# Patient Record
Sex: MALE | Race: BLACK OR AFRICAN AMERICAN | NOT HISPANIC OR LATINO | URBAN - METROPOLITAN AREA
[De-identification: names, ages, dates, MRNs, and addresses within clinical notes are randomized per-mention and may not be internally consistent; named-entity substitution may affect disease eponyms.]

---

## 2018-11-14 ENCOUNTER — INPATIENT (INPATIENT)
Facility: HOSPITAL | Age: 23
LOS: 13 days | Discharge: HOME | End: 2018-11-28
Attending: PSYCHIATRY & NEUROLOGY | Admitting: PSYCHIATRY & NEUROLOGY
Payer: MEDICAID

## 2018-11-14 VITALS
SYSTOLIC BLOOD PRESSURE: 146 MMHG | OXYGEN SATURATION: 100 % | TEMPERATURE: 98 F | HEART RATE: 76 BPM | DIASTOLIC BLOOD PRESSURE: 106 MMHG | RESPIRATION RATE: 16 BRPM

## 2018-11-14 DIAGNOSIS — F29 UNSPECIFIED PSYCHOSIS NOT DUE TO A SUBSTANCE OR KNOWN PHYSIOLOGICAL CONDITION: ICD-10-CM

## 2018-11-14 LAB
ALBUMIN SERPL ELPH-MCNC: 5.3 G/DL — HIGH (ref 3.3–5)
ALP SERPL-CCNC: 63 U/L — SIGNIFICANT CHANGE UP (ref 40–120)
ALT FLD-CCNC: 17 U/L — SIGNIFICANT CHANGE UP (ref 4–41)
APAP SERPL-MCNC: < 15 UG/ML — LOW (ref 15–25)
AST SERPL-CCNC: 17 U/L — SIGNIFICANT CHANGE UP (ref 4–40)
BASOPHILS # BLD AUTO: 0.05 K/UL — SIGNIFICANT CHANGE UP (ref 0–0.2)
BASOPHILS NFR BLD AUTO: 0.9 % — SIGNIFICANT CHANGE UP (ref 0–2)
BILIRUB SERPL-MCNC: 0.3 MG/DL — SIGNIFICANT CHANGE UP (ref 0.2–1.2)
BUN SERPL-MCNC: 8 MG/DL — SIGNIFICANT CHANGE UP (ref 7–23)
CALCIUM SERPL-MCNC: 10.2 MG/DL — SIGNIFICANT CHANGE UP (ref 8.4–10.5)
CHLORIDE SERPL-SCNC: 98 MMOL/L — SIGNIFICANT CHANGE UP (ref 98–107)
CO2 SERPL-SCNC: 29 MMOL/L — SIGNIFICANT CHANGE UP (ref 22–31)
CREAT SERPL-MCNC: 0.8 MG/DL — SIGNIFICANT CHANGE UP (ref 0.5–1.3)
EOSINOPHIL # BLD AUTO: 0.02 K/UL — SIGNIFICANT CHANGE UP (ref 0–0.5)
EOSINOPHIL NFR BLD AUTO: 0.4 % — SIGNIFICANT CHANGE UP (ref 0–6)
ETHANOL BLD-MCNC: < 10 MG/DL — SIGNIFICANT CHANGE UP
GLUCOSE SERPL-MCNC: 101 MG/DL — HIGH (ref 70–99)
HCT VFR BLD CALC: 48.3 % — SIGNIFICANT CHANGE UP (ref 39–50)
HGB BLD-MCNC: 15.5 G/DL — SIGNIFICANT CHANGE UP (ref 13–17)
IMM GRANULOCYTES # BLD AUTO: 0.01 # — SIGNIFICANT CHANGE UP
IMM GRANULOCYTES NFR BLD AUTO: 0.2 % — SIGNIFICANT CHANGE UP (ref 0–1.5)
LYMPHOCYTES # BLD AUTO: 2.1 K/UL — SIGNIFICANT CHANGE UP (ref 1–3.3)
LYMPHOCYTES # BLD AUTO: 37.8 % — SIGNIFICANT CHANGE UP (ref 13–44)
MCHC RBC-ENTMCNC: 26.9 PG — LOW (ref 27–34)
MCHC RBC-ENTMCNC: 32.1 % — SIGNIFICANT CHANGE UP (ref 32–36)
MCV RBC AUTO: 83.9 FL — SIGNIFICANT CHANGE UP (ref 80–100)
MONOCYTES # BLD AUTO: 0.39 K/UL — SIGNIFICANT CHANGE UP (ref 0–0.9)
MONOCYTES NFR BLD AUTO: 7 % — SIGNIFICANT CHANGE UP (ref 2–14)
NEUTROPHILS # BLD AUTO: 2.99 K/UL — SIGNIFICANT CHANGE UP (ref 1.8–7.4)
NEUTROPHILS NFR BLD AUTO: 53.7 % — SIGNIFICANT CHANGE UP (ref 43–77)
NRBC # FLD: 0 — SIGNIFICANT CHANGE UP
PLATELET # BLD AUTO: 179 K/UL — SIGNIFICANT CHANGE UP (ref 150–400)
PMV BLD: 11.3 FL — SIGNIFICANT CHANGE UP (ref 7–13)
POTASSIUM SERPL-MCNC: 3.3 MMOL/L — LOW (ref 3.5–5.3)
POTASSIUM SERPL-SCNC: 3.3 MMOL/L — LOW (ref 3.5–5.3)
PROT SERPL-MCNC: 8.5 G/DL — HIGH (ref 6–8.3)
RBC # BLD: 5.76 M/UL — SIGNIFICANT CHANGE UP (ref 4.2–5.8)
RBC # FLD: 13.2 % — SIGNIFICANT CHANGE UP (ref 10.3–14.5)
SALICYLATES SERPL-MCNC: < 5 MG/DL — LOW (ref 15–30)
SODIUM SERPL-SCNC: 141 MMOL/L — SIGNIFICANT CHANGE UP (ref 135–145)
TSH SERPL-MCNC: 4.57 UIU/ML — HIGH (ref 0.27–4.2)
WBC # BLD: 5.56 K/UL — SIGNIFICANT CHANGE UP (ref 3.8–10.5)
WBC # FLD AUTO: 5.56 K/UL — SIGNIFICANT CHANGE UP (ref 3.8–10.5)

## 2018-11-14 PROCEDURE — 99285 EMERGENCY DEPT VISIT HI MDM: CPT

## 2018-11-14 NOTE — ED PROVIDER NOTE - OBJECTIVE STATEMENT
23 yom with hx of schizophrenia diagnosed 1 year ago but on no meds. Aunt notes that pt has been more withdrawn than usual, pacing the house, making family members uncomfortable. No altercation, verbal or physical ever, no suicidal or homocidal gestures or actions. No problems eating as far as family knows. Does not sleep well. Patient agreed to come in because he agrees "I need med."

## 2018-11-14 NOTE — ED BEHAVIORAL HEALTH ASSESSMENT NOTE - DESCRIPTION
Calm but non-cooperative   Vital Signs Last 24 Hrs  T(C): 36.9 (14 Nov 2018 18:33), Max: 36.9 (14 Nov 2018 18:33)  T(F): 98.5 (14 Nov 2018 18:33), Max: 98.5 (14 Nov 2018 18:33)  HR: 76 (14 Nov 2018 18:33) (76 - 76)  BP: 146/106 (14 Nov 2018 18:33) (146/106 - 146/106)  BP(mean): --  RR: 16 (14 Nov 2018 18:33) (16 - 16)  SpO2: 100% (14 Nov 2018 18:33) (100% - 100%) None reported Lives with family

## 2018-11-14 NOTE — ED BEHAVIORAL HEALTH ASSESSMENT NOTE - HPI (INCLUDE ILLNESS QUALITY, SEVERITY, DURATION, TIMING, CONTEXT, MODIFYING FACTORS, ASSOCIATED SIGNS AND SYMPTOMS)
24 yo AAM w/ h/o schizophrenia and chronic hx of treatment/medication non-compliance. Per family patient has been pacing, awake all night, attempting to break into his sisters room. On examination in the ED pt is internally pre-occupied and limited in his response due to what appears to be severe internal preoccupation. Pt asked what brought him to the hospital? Pt states, " depression," and then start to laughing and state " I  can't sleep." Pt unable to continue interview due to his severe sx of psychosis. 22 yo AAM w/ h/o schizophrenia and chronic hx of treatment/medication non-compliance. Per family patient has been pacing, awake all night, attempting to break into his sisters room. On examination in the ED pt is internally pre-occupied and limited in his response due to what appears to be severe internal preoccupation. Pt asked what brought him to the hospital? Pt states, " depression," and then start to laughing and state " I can't sleep." Pt unable to continue interview due to his severe sx of psychosis.    See SINGH  note for collateral info. 24 yo AAM w/ h/o schizophrenia and chronic hx of treatment/medication non-compliance. Per family patient has been pacing, awake all night, attempting to break into his sisters room. On examination in the ED pt is internally pre-occupied and limited in his response due to what appears to be severe internal preoccupation. Pt asked what brought him to the hospital? Pt states, " depression," and then start to laughing and state " I can't sleep." Pt unable to continue interview due to his severe sx of psychosis.  Per SW collateral info: The patient resides with the informant in her home, along with his grandmother, another aunt, sister and niece (7 y/o), brother (27 y/o), 2 tenants and their daughters, ages 7 and 3. The patient has no OP care. He has never had IP care. The informant called 911 today because of worsening decompensation which has been causing family members to fear for their safety.     The patient has been isolative for a long time, but this has been worse for the past 2 weeks. He has never spoken unless spoken to for many years. For the past couple of months he will not join the family in the kitchen for meals, and refuses to eat most of the food family members bring to his room. Lately he has been hiding in the garage while the family goes to Confucianism services. Last night he took a coin to force the lock to his sister’s bedroom door open, pushing to get in, while she pushed back, frightening her. The patient suffers with insomnia, and sleeps a couple of hours in the morning. Until recently he had been sleeping in the bathtub, getting wet, seemingly unconcerned. He is totally socially isolative, not being willing to emerge from his room, and speaks minimally only when spoken to. He does not tend to hygiene at all. He has been smiling to himself inappropriately. He has been behaving bizarrely, such as sitting on the floor of his grandmother’s bedroom today, staring blankly, and another room another day. Today the patient’s mother and aunt became so afraid due to his behavior that they locked themselves in their bedrooms. The informant had made an appointment for the patient to see a psychologist, details unknown, but the patient refused to go. The patient has no history of substance abuse. He has never attempted to harm others or damage property, nor verbalized thoughts of such behavior. He has never expressed passive or active suicidal ideation, nor engaged in self-injurious behavior. He does not have access to a gun. There is no family history of mental illness. He has no medical ailments and is on no medications.     Last year the patient went to [facility] ED, from which he was discharged with medication, details unknown, which he refused to take. This past summer he was sent to stay with his father in [facility]. His father took him to a doctor who prescribed medication, details unknown. The patient discontinued the medication due to tremulousness.     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 He had been attending [facility] [employer] years ago before his illness. Patient’s father resides in [place], [place] and his mother resides in [place]. He was raised by his grandmother.

## 2018-11-14 NOTE — ED BEHAVIORAL HEALTH ASSESSMENT NOTE - OTHER
Family sitting down Speech difficult to understand due to low volume Appearing to be responding to internal stimuli TONG due to psychosis Pending transfer to open bed Either 9.39 or  9.27 pending

## 2018-11-14 NOTE — ED ADULT NURSE NOTE - CHIEF COMPLAINT QUOTE
PMH of schizophrenia, as per family patient has been pacing, awake all night, attempting to break into his sisters room, non compliant with medications. Pt currently calm.

## 2018-11-14 NOTE — ED ADULT NURSE NOTE - OBJECTIVE STATEMENT
23y y M AaOx3 brought in by EMS for increased bizarre behavior / depression. pt states that he has been feeling down and depressed starting from a month ago and it slowly has been getting worse. pt denies SI / HI / AH / VH at this time. pt has no visual lacerations/ trauma  noted. pt family members states that he has been acting more bizarre at home presenting with actions in the middle of the night which are out of character for him. labs collected and sent.

## 2018-11-14 NOTE — ED ADULT NURSE NOTE - NSIMPLEMENTINTERV_GEN_ALL_ED
Implemented All Universal Safety Interventions:  [place] to call system. Call bell, personal items and telephone within reach. Instruct patient to call for assistance. Room bathroom lighting operational. Non-slip footwear when patient is off stretcher. Physically safe environment: no spills, clutter or unnecessary equipment. Stretcher in lowest position, wheels locked, appropriate side rails in place.

## 2018-11-14 NOTE — ED BEHAVIORAL HEALTH NOTE - BEHAVIORAL HEALTH NOTE
Writer spoke with patient’s aunt, Diane Headley, [phone], in the [facility] ED, for collateral information. She reported the following:    The patient resides with the informant in her home, along with his grandmother, another aunt, sister and niece (9 y/o), brother (29 y/o), 2 tenants and their daughters, ages 7 and 3. The patient has no OP care. He has never had IP care. The informant called 911 today because of worsening decompensation which has been causing family members to fear for their safety.     The patient has been isolative for a long time, but this has been worse for the past 2 weeks. He has never spoken unless spoken to for many years. For the past couple of months he will not join the family in the kitchen for meals, and refuses to eat most of the food family members bring to his room. Lately he has been hiding in the garage while the family goes to Shinto services. Last night he took a coin to force the lock to his sister’s bedroom door open, pushing to get in, while she pushed back, frightening her. The patient suffers with insomnia, and sleeps a couple of hours in the morning. Until recently he had been sleeping in the bathtub, getting wet, seemingly unconcerned. He is totally socially isolative, not being willing to emerge from his room, and speaks minimally only when spoken to. He does not tend to hygiene at all. He has been smiling to himself inappropriately. He has been behaving bizarrely, such as sitting on the floor of his grandmother’s bedroom today, staring blankly, and another room another day. Today the patient’s mother and aunt became so afraid due to his behavior that they locked themselves in their bedrooms. The informant had made an appointment for the patient to see a psychologist, details unknown, but the patient refused to go. The patient has no history of substance abuse. He has never attempted to harm others or damage property, nor verbalized thoughts of such behavior. He has never expressed passive or active suicidal ideation, nor engaged in self-injurious behavior. He does not have access to a gun. There is no family history of mental illness. He has no medical ailments and is on no medications.     Last year the patient went to [facility] ED, from which he was discharged with medication, details unknown, which he refused to take. This past summer he was sent to stay with his father in [facility]. His father took him to a doctor who prescribed medication, details unknown. The patient discontinued the medication due to tremulousness.     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 He had been attending [employer] years ago before his illness. Patient’s father resides in [place], [place] and his mother resides in [place]. He was raised by his grandmother.     Writer called the [facility] NIDHI LOPEZ, Emilia Gerber, who stated no bed was available. Writer called [place], and spoke with Yoandy, who stated a bed was available there. Writer spoke with patient’s aunt, Diane Headley, [phone], in the [facility] ED, for collateral information. She reported the following:    The patient resides with the informant in her home, along with his grandmother, another aunt, sister and niece (7 y/o), brother (27 y/o), 2 tenants and their daughters, ages 7 and 3. The patient has no OP care. He has never had IP care. The informant called 911 today because of worsening decompensation which has been causing family members to fear for their safety.     The patient has been isolative for a long time, but this has been worse for the past 2 weeks. He has never spoken unless spoken to for many years. For the past couple of months he will not join the family in the kitchen for meals, and refuses to eat most of the food family members bring to his room. Lately he has been hiding in the garage while the family goes to Holiness services. Last night he took a coin to force the lock to his sister’s bedroom door open, pushing to get in, while she pushed back, frightening her. The patient suffers with insomnia, and sleeps a couple of hours in the morning. Until recently he had been sleeping in the bathtub, getting wet, seemingly unconcerned. He is totally socially isolative, not being willing to emerge from his room, and speaks minimally only when spoken to. He does not tend to hygiene at all. He has been smiling to himself inappropriately. He has been behaving bizarrely, such as sitting on the floor of his grandmother’s bedroom today, staring blankly, and another room another day. Today the patient’s mother and aunt became so afraid due to his behavior that they locked themselves in their bedrooms. The informant had made an appointment for the patient to see a psychologist, details unknown, but the patient refused to go. The patient has no history of substance abuse. He has never attempted to harm others or damage property, nor verbalized thoughts of such behavior. He has never expressed passive or active suicidal ideation, nor engaged in self-injurious behavior. He does not have access to a gun. There is no family history of mental illness. He has no medical ailments and is on no medications.     Last year the patient went to [facility] ED, from which he was discharged with medication, details unknown, which he refused to take. This past summer he was sent to stay with his father in [facility]. His father took him to a doctor who prescribed medication, details unknown. The patient discontinued the medication due to tremulousness.     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 He had been attending [employer] years ago before his illness. Patient’s father resides in [place], [place] and his mother resides in [place]. He was raised by his grandmother.     Writer called the [facility] NIDHI LOPEZ, Emilia Gerber, who stated no bed was available. Writer called [place], and spoke with Yoandy, who stated a bed was available there. Once the EKG was done, writer faxed it and the legal documents to Yoandy at [facility], to [phone]. Yoandy stated the doctor, Dr. Flor, was not available to inquire whether he had reviewed the evaluation. If he is accepted, the patient will go to admissions in [facility]. The UR staff member will be Nargis, phone [phone], fax [phone]. Writer will sign out the case to the morning , since if the patient is accepted, precert will be needed. Writer spoke with patient’s aunt, Diane Headley, [phone], in the [facility] ED, for collateral information. She reported the following:    The patient resides with the informant in her home, along with his grandmother, another aunt, sister and niece (7 y/o), brother (29 y/o), 2 tenants and their daughters, ages 7 and 3. The patient has no OP care. He has never had IP care. The informant called 911 today because of worsening decompensation which has been causing family members to fear for their safety.     The patient has been isolative for a long time, but this has been worse for the past 2 weeks. He has never spoken unless spoken to for many years. For the past couple of months he will not join the family in the kitchen for meals, and refuses to eat most of the food family members bring to his room. Lately he has been hiding in the garage while the family goes to Gnosticist services. Last night he took a coin to force the lock to his sister’s bedroom door open, pushing to get in, while she pushed back, frightening her. The patient suffers with insomnia, and sleeps a couple of hours in the morning. Until recently he had been sleeping in the bathtub, getting wet, seemingly unconcerned. He is totally socially isolative, not being willing to emerge from his room, and speaks minimally only when spoken to. He does not tend to hygiene at all. He has been smiling to himself inappropriately. He has been behaving bizarrely, such as sitting on the floor of his grandmother’s bedroom today, staring blankly, and another room another day. Today the patient’s mother and aunt became so afraid due to his behavior that they locked themselves in their bedrooms. The informant had made an appointment for the patient to see a psychologist, details unknown, but the patient refused to go. The patient has no history of substance abuse. He has never attempted to harm others or damage property, nor verbalized thoughts of such behavior. He has never expressed passive or active suicidal ideation, nor engaged in self-injurious behavior. He does not have access to a gun. There is no family history of mental illness. He has no medical ailments and is on no medications.     Last year the patient went to [facility] ED, from which he was discharged with medication, details unknown, which he refused to take. This past summer he was sent to stay with his father in [facility]. His father took him to a doctor who prescribed medication, details unknown. The patient discontinued the medication due to tremulousness.     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 He had been attending [employer] years ago before his illness. Patient’s father resides in [place], [place] and his mother resides in [place]. He was raised by his grandmother.     Writer called the [facility] NIDHI LOPEZ, Emilia Gerber, who stated no bed was available. Writer called [place], and spoke with Yoandy, who stated a bed was available there. Once the EKG was done, writer faxed it and the legal documents to Yoandy at [facility], to [phone]. Yoandy stated the doctor, Dr. Flor, was not available to inquire whether he had reviewed the evaluation. If he is accepted, the patient will go to admissions in [facility]. The UR staff member will be Nargis, phone [phone], fax [phone]. Writer will sign out the case to the morning , since if the patient is accepted, precert will be needed. Writer informed patient's aunt of the phone numbers of [facility] and the [facility] ED. Writer spoke with patient’s aunt, Diane Headley, [phone], in the [facility] ED, for collateral information. She reported the following:    The patient resides with the informant in her home, along with his grandmother, another aunt, sister and niece (9 y/o), brother (27 y/o), 2 tenants and their daughters, ages 7 and 3. The patient has no OP care. He has never had IP care. The informant called 911 today because of worsening decompensation which has been causing family members to fear for their safety.     The patient has been isolative for a long time, but this has been worse for the past 2 weeks. He has never spoken unless spoken to for many years. For the past couple of months he will not join the family in the kitchen for meals, and refuses to eat most of the food family members bring to his room. Lately he has been hiding in the garage while the family goes to Confucianism services. Last night he took a coin to force the lock to his sister’s bedroom door open, pushing to get in, while she pushed back, frightening her. The patient suffers with insomnia, and sleeps a couple of hours in the morning. Until recently he had been sleeping in the bathtub, getting wet, seemingly unconcerned. He is totally socially isolative, not being willing to emerge from his room, and speaks minimally only when spoken to. He does not tend to hygiene at all. He has been smiling to himself inappropriately. He has been behaving bizarrely, such as sitting on the floor of his grandmother’s bedroom today, staring blankly, and another room another day. Today the patient’s mother and aunt became so afraid due to his behavior that they locked themselves in their bedrooms. The informant had made an appointment for the patient to see a psychologist, details unknown, but the patient refused to go. The patient has no history of substance abuse. He has never attempted to harm others or damage property, nor verbalized thoughts of such behavior. He has never expressed passive or active suicidal ideation, nor engaged in self-injurious behavior. He does not have access to a gun. There is no family history of mental illness. He has no medical ailments and is on no medications.     Last year the patient went to [facility] ED, from which he was discharged with medication, details unknown, which he refused to take. This past summer he was sent to stay with his father in [facility]. His father took him to a doctor who prescribed medication, details unknown. The patient discontinued the medication due to tremulousness.     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 He had been attending [employer] years ago before his illness. Patient’s father resides in [place], [place] and his mother resides in [place]. He was raised by his grandmother.     Writer called the [facility] NIDHI LOPEZ, Emilia Gerber, who stated no bed was available. Writer called [place], and spoke with Yoandy, who stated a bed was available there. Once the EKG was done, writer faxed it and the legal documents to Yoandy at [facility], to [phone]. Yoandy stated the doctor, Dr. Flor, was not available to inquire whether he had reviewed the evaluation. If he is accepted, the patient will go to admissions in [facility]. The UR staff member will be Nargis, phone [phone], fax [phone]. Writer will sign out the case to the morning , since if the patient is accepted, precert will be needed. Writer informed patient's aunt of the phone numbers of [facility] and the [facility] ED. Writer informed the unit nurse and attending psychiatrists of the status of the case.

## 2018-11-14 NOTE — ED BEHAVIORAL HEALTH ASSESSMENT NOTE - OTHER PAST PSYCHIATRIC HISTORY (INCLUDE DETAILS REGARDING ONSET, COURSE OF ILLNESS, INPATIENT/OUTPATIENT TREATMENT)
Unclear hx but family states that they think he has never been admitted to a psych hospital. family reports no knowledge of meds regimen and he once went to an out pt doc but he had some reaction from which made him shake and so he stop going to the doc per family.

## 2018-11-14 NOTE — ED BEHAVIORAL HEALTH ASSESSMENT NOTE - SUMMARY
22 yo AAM w/ h/o schizophrenia and chronic hx of treatment/medication non-compliance. Per family patient has been pacing, awake all night, attempting to break into his sisters room. On examination in the ED pt is internally pre-occupied and limited in his response due to what appears to be severe internal preoccupation.   Base on pt level of psychosis in the form of thought disorder and disorganized behavior in the face of med non-compliance pt will need to be INVOL admitted to the psych unit for safety and stabilization.

## 2018-11-15 DIAGNOSIS — F29 UNSPECIFIED PSYCHOSIS NOT DUE TO A SUBSTANCE OR KNOWN PHYSIOLOGICAL CONDITION: ICD-10-CM

## 2018-11-15 LAB
AMPHET UR-MCNC: NEGATIVE — SIGNIFICANT CHANGE UP
APPEARANCE UR: CLEAR — SIGNIFICANT CHANGE UP
BACTERIA # UR AUTO: NEGATIVE — SIGNIFICANT CHANGE UP
BARBITURATES UR SCN-MCNC: NEGATIVE — SIGNIFICANT CHANGE UP
BENZODIAZ UR-MCNC: NEGATIVE — SIGNIFICANT CHANGE UP
BILIRUB UR-MCNC: NEGATIVE — SIGNIFICANT CHANGE UP
BLOOD UR QL VISUAL: NEGATIVE — SIGNIFICANT CHANGE UP
CANNABINOIDS UR-MCNC: NEGATIVE — SIGNIFICANT CHANGE UP
COCAINE METAB.OTHER UR-MCNC: NEGATIVE — SIGNIFICANT CHANGE UP
COLOR SPEC: YELLOW — SIGNIFICANT CHANGE UP
GLUCOSE UR-MCNC: NEGATIVE — SIGNIFICANT CHANGE UP
HYALINE CASTS # UR AUTO: SIGNIFICANT CHANGE UP
KETONES UR-MCNC: NEGATIVE — SIGNIFICANT CHANGE UP
LEUKOCYTE ESTERASE UR-ACNC: NEGATIVE — SIGNIFICANT CHANGE UP
METHADONE UR-MCNC: NEGATIVE — SIGNIFICANT CHANGE UP
NITRITE UR-MCNC: NEGATIVE — SIGNIFICANT CHANGE UP
OPIATES UR-MCNC: NEGATIVE — SIGNIFICANT CHANGE UP
OXYCODONE UR-MCNC: NEGATIVE — SIGNIFICANT CHANGE UP
PCP UR-MCNC: NEGATIVE — SIGNIFICANT CHANGE UP
PH UR: 6.5 — SIGNIFICANT CHANGE UP (ref 5–8)
PROT UR-MCNC: 20 — SIGNIFICANT CHANGE UP
RBC CASTS # UR COMP ASSIST: SIGNIFICANT CHANGE UP (ref 0–?)
SP GR SPEC: 1.02 — SIGNIFICANT CHANGE UP (ref 1–1.04)
SQUAMOUS # UR AUTO: SIGNIFICANT CHANGE UP
UROBILINOGEN FLD QL: NORMAL — SIGNIFICANT CHANGE UP
WBC UR QL: SIGNIFICANT CHANGE UP (ref 0–?)

## 2018-11-15 PROCEDURE — 99222 1ST HOSP IP/OBS MODERATE 55: CPT | Mod: 25

## 2018-11-15 PROCEDURE — 90853 GROUP PSYCHOTHERAPY: CPT

## 2018-11-15 RX ORDER — HALOPERIDOL DECANOATE 100 MG/ML
5 INJECTION INTRAMUSCULAR ONCE
Qty: 0 | Refills: 0 | Status: DISCONTINUED | OUTPATIENT
Start: 2018-11-15 | End: 2018-11-28

## 2018-11-15 RX ORDER — HALOPERIDOL DECANOATE 100 MG/ML
5 INJECTION INTRAMUSCULAR EVERY 4 HOURS
Qty: 0 | Refills: 0 | Status: DISCONTINUED | OUTPATIENT
Start: 2018-11-15 | End: 2018-11-15

## 2018-11-15 RX ORDER — RISPERIDONE 4 MG/1
1 TABLET ORAL AT BEDTIME
Qty: 0 | Refills: 0 | Status: DISCONTINUED | OUTPATIENT
Start: 2018-11-15 | End: 2018-11-19

## 2018-11-15 RX ADMIN — RISPERIDONE 1 MILLIGRAM(S): 4 TABLET ORAL at 21:01

## 2018-11-15 NOTE — ED ADULT NURSE REASSESSMENT NOTE - NS ED NURSE REASSESS COMMENT FT1
Author called [place], and spoke with Yoandy ([phone]).  Yoandy stated the doctor was not available and he would inquire whether the documentation had been reviewed for admitssion.  Time of call was 01:15.  Yoandy stated he would call back with more information.  awaiting call

## 2018-11-16 LAB
CHOLEST SERPL-MCNC: 144 MG/DL — SIGNIFICANT CHANGE UP (ref 120–199)
HBA1C BLD-MCNC: 5.2 % — SIGNIFICANT CHANGE UP (ref 4–5.6)
HDLC SERPL-MCNC: 58 MG/DL — HIGH (ref 35–55)
LIPID PNL WITH DIRECT LDL SERPL: 87 MG/DL — SIGNIFICANT CHANGE UP
T3 SERPL-MCNC: 115.1 NG/DL — SIGNIFICANT CHANGE UP (ref 80–200)
T4 AB SER-ACNC: 9.43 UG/DL — SIGNIFICANT CHANGE UP (ref 5.1–13)
TRIGL SERPL-MCNC: 38 MG/DL — SIGNIFICANT CHANGE UP (ref 10–149)
TSH SERPL-MCNC: 2.24 UIU/ML — SIGNIFICANT CHANGE UP (ref 0.27–4.2)

## 2018-11-16 PROCEDURE — 99232 SBSQ HOSP IP/OBS MODERATE 35: CPT

## 2018-11-16 RX ADMIN — RISPERIDONE 1 MILLIGRAM(S): 4 TABLET ORAL at 21:50

## 2018-11-17 PROCEDURE — 99232 SBSQ HOSP IP/OBS MODERATE 35: CPT

## 2018-11-17 RX ADMIN — RISPERIDONE 1 MILLIGRAM(S): 4 TABLET ORAL at 20:54

## 2018-11-18 RX ADMIN — RISPERIDONE 1 MILLIGRAM(S): 4 TABLET ORAL at 20:53

## 2018-11-19 PROCEDURE — 99232 SBSQ HOSP IP/OBS MODERATE 35: CPT

## 2018-11-19 RX ORDER — RISPERIDONE 4 MG/1
2 TABLET ORAL AT BEDTIME
Qty: 0 | Refills: 0 | Status: DISCONTINUED | OUTPATIENT
Start: 2018-11-19 | End: 2018-11-23

## 2018-11-19 RX ADMIN — RISPERIDONE 2 MILLIGRAM(S): 4 TABLET ORAL at 21:44

## 2018-11-20 PROCEDURE — 99232 SBSQ HOSP IP/OBS MODERATE 35: CPT

## 2018-11-20 RX ADMIN — RISPERIDONE 2 MILLIGRAM(S): 4 TABLET ORAL at 21:51

## 2018-11-21 PROCEDURE — 99232 SBSQ HOSP IP/OBS MODERATE 35: CPT

## 2018-11-21 RX ADMIN — RISPERIDONE 2 MILLIGRAM(S): 4 TABLET ORAL at 20:45

## 2018-11-22 RX ADMIN — RISPERIDONE 2 MILLIGRAM(S): 4 TABLET ORAL at 20:40

## 2018-11-23 PROCEDURE — 99232 SBSQ HOSP IP/OBS MODERATE 35: CPT | Mod: 25

## 2018-11-23 PROCEDURE — 90853 GROUP PSYCHOTHERAPY: CPT

## 2018-11-23 RX ORDER — RISPERIDONE 4 MG/1
3 TABLET ORAL AT BEDTIME
Qty: 0 | Refills: 0 | Status: DISCONTINUED | OUTPATIENT
Start: 2018-11-23 | End: 2018-11-28

## 2018-11-23 RX ADMIN — RISPERIDONE 3 MILLIGRAM(S): 4 TABLET ORAL at 20:47

## 2018-11-25 RX ADMIN — RISPERIDONE 3 MILLIGRAM(S): 4 TABLET ORAL at 00:14

## 2018-11-25 RX ADMIN — RISPERIDONE 3 MILLIGRAM(S): 4 TABLET ORAL at 21:06

## 2018-11-26 PROCEDURE — 99232 SBSQ HOSP IP/OBS MODERATE 35: CPT

## 2018-11-26 RX ADMIN — RISPERIDONE 3 MILLIGRAM(S): 4 TABLET ORAL at 21:26

## 2018-11-27 PROCEDURE — 90853 GROUP PSYCHOTHERAPY: CPT

## 2018-11-27 PROCEDURE — 99232 SBSQ HOSP IP/OBS MODERATE 35: CPT | Mod: 25

## 2018-11-27 RX ORDER — RISPERIDONE 4 MG/1
1 TABLET ORAL
Qty: 30 | Refills: 0
Start: 2018-11-27 | End: 2018-12-26

## 2018-11-27 RX ADMIN — RISPERIDONE 3 MILLIGRAM(S): 4 TABLET ORAL at 21:21

## 2018-11-28 VITALS — HEART RATE: 89 BPM | DIASTOLIC BLOOD PRESSURE: 70 MMHG | SYSTOLIC BLOOD PRESSURE: 109 MMHG | TEMPERATURE: 98 F

## 2018-11-28 PROBLEM — F20.89 OTHER SCHIZOPHRENIA: Chronic | Status: ACTIVE | Noted: 2018-11-14

## 2018-11-28 PROCEDURE — 99238 HOSP IP/OBS DSCHRG MGMT 30/<: CPT

## 2018-12-03 ENCOUNTER — OUTPATIENT (OUTPATIENT)
Dept: OUTPATIENT SERVICES | Facility: HOSPITAL | Age: 23
LOS: 1 days | Discharge: HOME | End: 2018-12-03
Payer: COMMERCIAL

## 2018-12-04 DIAGNOSIS — F20.1 DISORGANIZED SCHIZOPHRENIA: ICD-10-CM

## 2019-01-17 ENCOUNTER — OUTPATIENT (OUTPATIENT)
Dept: OUTPATIENT SERVICES | Facility: HOSPITAL | Age: 24
LOS: 1 days | Discharge: HOME | End: 2019-01-17
Payer: MEDICAID

## 2019-02-25 DIAGNOSIS — F20.9 SCHIZOPHRENIA, UNSPECIFIED: ICD-10-CM

## 2021-01-13 PROCEDURE — 99214 OFFICE O/P EST MOD 30 MIN: CPT | Mod: 95

## 2021-02-24 ENCOUNTER — OUTPATIENT (OUTPATIENT)
Dept: OUTPATIENT SERVICES | Facility: HOSPITAL | Age: 26
LOS: 1 days | End: 2021-02-24
Payer: SUBSIDIZED

## 2021-02-24 ENCOUNTER — APPOINTMENT (OUTPATIENT)
Dept: MRI IMAGING | Facility: HOSPITAL | Age: 26
End: 2021-02-24

## 2021-02-24 DIAGNOSIS — Z00.6 ENCOUNTER FOR EXAMINATION FOR NORMAL COMPARISON AND CONTROL IN CLINICAL RESEARCH PROGRAM: ICD-10-CM

## 2021-02-24 DIAGNOSIS — Z00.00 ENCOUNTER FOR GENERAL ADULT MEDICAL EXAMINATION WITHOUT ABNORMAL FINDINGS: ICD-10-CM

## 2021-02-24 PROCEDURE — 70551 MRI BRAIN STEM W/O DYE: CPT

## 2021-02-24 PROCEDURE — 70551 MRI BRAIN STEM W/O DYE: CPT | Mod: 26

## 2021-03-24 PROCEDURE — 99214 OFFICE O/P EST MOD 30 MIN: CPT | Mod: 95

## 2021-05-19 PROCEDURE — 99214 OFFICE O/P EST MOD 30 MIN: CPT | Mod: 95

## 2021-09-22 PROCEDURE — 99214 OFFICE O/P EST MOD 30 MIN: CPT | Mod: 95

## 2021-11-10 PROCEDURE — 99214 OFFICE O/P EST MOD 30 MIN: CPT | Mod: 95

## 2022-02-03 PROCEDURE — 99214 OFFICE O/P EST MOD 30 MIN: CPT | Mod: 95

## 2022-02-23 ENCOUNTER — APPOINTMENT (OUTPATIENT)
Dept: MRI IMAGING | Facility: HOSPITAL | Age: 27
End: 2022-02-23

## 2022-02-23 ENCOUNTER — OUTPATIENT (OUTPATIENT)
Dept: OUTPATIENT SERVICES | Facility: HOSPITAL | Age: 27
LOS: 1 days | End: 2022-02-23
Payer: SUBSIDIZED

## 2022-02-23 DIAGNOSIS — Z00.6 ENCOUNTER FOR EXAMINATION FOR NORMAL COMPARISON AND CONTROL IN CLINICAL RESEARCH PROGRAM: ICD-10-CM

## 2022-02-23 DIAGNOSIS — Z00.00 ENCOUNTER FOR GENERAL ADULT MEDICAL EXAMINATION WITHOUT ABNORMAL FINDINGS: ICD-10-CM

## 2022-02-23 PROCEDURE — 70551 MRI BRAIN STEM W/O DYE: CPT | Mod: 26

## 2022-02-23 PROCEDURE — 70551 MRI BRAIN STEM W/O DYE: CPT

## 2022-04-28 PROCEDURE — 99214 OFFICE O/P EST MOD 30 MIN: CPT | Mod: 95

## 2022-06-01 PROBLEM — Z00.00 ENCOUNTER FOR PREVENTIVE HEALTH EXAMINATION: Status: ACTIVE | Noted: 2022-06-01

## 2022-06-01 PROCEDURE — 99214 OFFICE O/P EST MOD 30 MIN: CPT | Mod: 95

## 2022-11-01 NOTE — ED BEHAVIORAL HEALTH ASSESSMENT NOTE - DIFFERENTIAL
Implemented All Universal Safety Interventions:  [place] to call system. Call bell, personal items and telephone within reach. Instruct patient to call for assistance. Room bathroom lighting operational. Non-slip footwear when patient is off stretcher. Physically safe environment: no spills, clutter or unnecessary equipment. Stretcher in lowest position, wheels locked, appropriate side rails in place. Implemented All Fall Risk Interventions:  [place] to call system. Call bell, personal items and telephone within reach. Instruct patient to call for assistance. Room bathroom lighting operational. Non-slip footwear when patient is off stretcher. Physically safe environment: no spills, clutter or unnecessary equipment. Stretcher in lowest position, wheels locked, appropriate side rails in place. Provide visual cue, wrist band, yellow gown, etc. Monitor gait and stability. Monitor for mental status changes and reorient to person, place, and time. Review medications for side effects contributing to fall risk. Reinforce activity limits and safety measures with patient and family. Schizophrenia vs Schizoaffective

## 2023-06-16 ENCOUNTER — EMERGENCY (EMERGENCY)
Facility: HOSPITAL | Age: 28
LOS: 1 days | Discharge: PSYCHIATRIC OR SUBSTANCE USE FACILITY | End: 2023-06-16
Attending: EMERGENCY MEDICINE | Admitting: EMERGENCY MEDICINE
Payer: MEDICAID

## 2023-06-16 VITALS
DIASTOLIC BLOOD PRESSURE: 72 MMHG | SYSTOLIC BLOOD PRESSURE: 128 MMHG | HEART RATE: 62 BPM | RESPIRATION RATE: 18 BRPM | TEMPERATURE: 98 F | OXYGEN SATURATION: 100 %

## 2023-06-16 VITALS
TEMPERATURE: 97 F | OXYGEN SATURATION: 100 % | RESPIRATION RATE: 18 BRPM | DIASTOLIC BLOOD PRESSURE: 85 MMHG | HEART RATE: 64 BPM | SYSTOLIC BLOOD PRESSURE: 121 MMHG

## 2023-06-16 DIAGNOSIS — F20.9 SCHIZOPHRENIA, UNSPECIFIED: ICD-10-CM

## 2023-06-16 LAB
ALBUMIN SERPL ELPH-MCNC: 4.7 G/DL — SIGNIFICANT CHANGE UP (ref 3.3–5)
ALP SERPL-CCNC: 47 U/L — SIGNIFICANT CHANGE UP (ref 40–120)
ALT FLD-CCNC: 14 U/L — SIGNIFICANT CHANGE UP (ref 4–41)
AMPHET UR-MCNC: NEGATIVE — SIGNIFICANT CHANGE UP
ANION GAP SERPL CALC-SCNC: 13 MMOL/L — SIGNIFICANT CHANGE UP (ref 7–14)
APAP SERPL-MCNC: <10 UG/ML — LOW (ref 15–25)
APPEARANCE UR: CLEAR — SIGNIFICANT CHANGE UP
AST SERPL-CCNC: 13 U/L — SIGNIFICANT CHANGE UP (ref 4–40)
B PERT DNA SPEC QL NAA+PROBE: SIGNIFICANT CHANGE UP
B PERT+PARAPERT DNA PNL SPEC NAA+PROBE: SIGNIFICANT CHANGE UP
BARBITURATES UR SCN-MCNC: NEGATIVE — SIGNIFICANT CHANGE UP
BASOPHILS # BLD AUTO: 0.05 K/UL — SIGNIFICANT CHANGE UP (ref 0–0.2)
BASOPHILS NFR BLD AUTO: 1.3 % — SIGNIFICANT CHANGE UP (ref 0–2)
BENZODIAZ UR-MCNC: NEGATIVE — SIGNIFICANT CHANGE UP
BILIRUB SERPL-MCNC: 0.3 MG/DL — SIGNIFICANT CHANGE UP (ref 0.2–1.2)
BILIRUB UR-MCNC: NEGATIVE — SIGNIFICANT CHANGE UP
BORDETELLA PARAPERTUSSIS (RAPRVP): SIGNIFICANT CHANGE UP
BUN SERPL-MCNC: 6 MG/DL — LOW (ref 7–23)
C PNEUM DNA SPEC QL NAA+PROBE: SIGNIFICANT CHANGE UP
CALCIUM SERPL-MCNC: 9.2 MG/DL — SIGNIFICANT CHANGE UP (ref 8.4–10.5)
CHLORIDE SERPL-SCNC: 102 MMOL/L — SIGNIFICANT CHANGE UP (ref 98–107)
CO2 SERPL-SCNC: 25 MMOL/L — SIGNIFICANT CHANGE UP (ref 22–31)
COCAINE METAB.OTHER UR-MCNC: NEGATIVE — SIGNIFICANT CHANGE UP
COLOR SPEC: COLORLESS — SIGNIFICANT CHANGE UP
CREAT SERPL-MCNC: 0.84 MG/DL — SIGNIFICANT CHANGE UP (ref 0.5–1.3)
CREATININE URINE RESULT, DAU: 27 MG/DL — SIGNIFICANT CHANGE UP
DIFF PNL FLD: NEGATIVE — SIGNIFICANT CHANGE UP
EGFR: 122 ML/MIN/1.73M2 — SIGNIFICANT CHANGE UP
EOSINOPHIL # BLD AUTO: 0.06 K/UL — SIGNIFICANT CHANGE UP (ref 0–0.5)
EOSINOPHIL NFR BLD AUTO: 1.5 % — SIGNIFICANT CHANGE UP (ref 0–6)
ETHANOL SERPL-MCNC: <10 MG/DL — SIGNIFICANT CHANGE UP
FLUAV SUBTYP SPEC NAA+PROBE: SIGNIFICANT CHANGE UP
FLUBV RNA SPEC QL NAA+PROBE: SIGNIFICANT CHANGE UP
GLUCOSE SERPL-MCNC: 113 MG/DL — HIGH (ref 70–99)
GLUCOSE UR QL: NEGATIVE — SIGNIFICANT CHANGE UP
HADV DNA SPEC QL NAA+PROBE: SIGNIFICANT CHANGE UP
HCOV 229E RNA SPEC QL NAA+PROBE: SIGNIFICANT CHANGE UP
HCOV HKU1 RNA SPEC QL NAA+PROBE: SIGNIFICANT CHANGE UP
HCOV NL63 RNA SPEC QL NAA+PROBE: SIGNIFICANT CHANGE UP
HCOV OC43 RNA SPEC QL NAA+PROBE: SIGNIFICANT CHANGE UP
HCT VFR BLD CALC: 45.6 % — SIGNIFICANT CHANGE UP (ref 39–50)
HGB BLD-MCNC: 14.2 G/DL — SIGNIFICANT CHANGE UP (ref 13–17)
HMPV RNA SPEC QL NAA+PROBE: SIGNIFICANT CHANGE UP
HPIV1 RNA SPEC QL NAA+PROBE: SIGNIFICANT CHANGE UP
HPIV2 RNA SPEC QL NAA+PROBE: SIGNIFICANT CHANGE UP
HPIV3 RNA SPEC QL NAA+PROBE: SIGNIFICANT CHANGE UP
HPIV4 RNA SPEC QL NAA+PROBE: SIGNIFICANT CHANGE UP
IANC: 1.66 K/UL — LOW (ref 1.8–7.4)
IMM GRANULOCYTES NFR BLD AUTO: 0.3 % — SIGNIFICANT CHANGE UP (ref 0–0.9)
KETONES UR-MCNC: NEGATIVE — SIGNIFICANT CHANGE UP
LEUKOCYTE ESTERASE UR-ACNC: NEGATIVE — SIGNIFICANT CHANGE UP
LYMPHOCYTES # BLD AUTO: 1.76 K/UL — SIGNIFICANT CHANGE UP (ref 1–3.3)
LYMPHOCYTES # BLD AUTO: 44.3 % — HIGH (ref 13–44)
M PNEUMO DNA SPEC QL NAA+PROBE: SIGNIFICANT CHANGE UP
MCHC RBC-ENTMCNC: 27 PG — SIGNIFICANT CHANGE UP (ref 27–34)
MCHC RBC-ENTMCNC: 31.1 GM/DL — LOW (ref 32–36)
MCV RBC AUTO: 86.9 FL — SIGNIFICANT CHANGE UP (ref 80–100)
METHADONE UR-MCNC: NEGATIVE — SIGNIFICANT CHANGE UP
MONOCYTES # BLD AUTO: 0.43 K/UL — SIGNIFICANT CHANGE UP (ref 0–0.9)
MONOCYTES NFR BLD AUTO: 10.8 % — SIGNIFICANT CHANGE UP (ref 2–14)
NEUTROPHILS # BLD AUTO: 1.66 K/UL — LOW (ref 1.8–7.4)
NEUTROPHILS NFR BLD AUTO: 41.8 % — LOW (ref 43–77)
NITRITE UR-MCNC: NEGATIVE — SIGNIFICANT CHANGE UP
NRBC # BLD: 0 /100 WBCS — SIGNIFICANT CHANGE UP (ref 0–0)
NRBC # FLD: 0 K/UL — SIGNIFICANT CHANGE UP (ref 0–0)
OPIATES UR-MCNC: NEGATIVE — SIGNIFICANT CHANGE UP
OXYCODONE UR-MCNC: NEGATIVE — SIGNIFICANT CHANGE UP
PCP SPEC-MCNC: SIGNIFICANT CHANGE UP
PCP UR-MCNC: NEGATIVE — SIGNIFICANT CHANGE UP
PH UR: 7 — SIGNIFICANT CHANGE UP (ref 5–8)
PLATELET # BLD AUTO: 198 K/UL — SIGNIFICANT CHANGE UP (ref 150–400)
POTASSIUM SERPL-MCNC: 3.4 MMOL/L — LOW (ref 3.5–5.3)
POTASSIUM SERPL-SCNC: 3.4 MMOL/L — LOW (ref 3.5–5.3)
PROT SERPL-MCNC: 7.3 G/DL — SIGNIFICANT CHANGE UP (ref 6–8.3)
PROT UR-MCNC: NEGATIVE — SIGNIFICANT CHANGE UP
RAPID RVP RESULT: SIGNIFICANT CHANGE UP
RBC # BLD: 5.25 M/UL — SIGNIFICANT CHANGE UP (ref 4.2–5.8)
RBC # FLD: 13.6 % — SIGNIFICANT CHANGE UP (ref 10.3–14.5)
RSV RNA SPEC QL NAA+PROBE: SIGNIFICANT CHANGE UP
RV+EV RNA SPEC QL NAA+PROBE: SIGNIFICANT CHANGE UP
SALICYLATES SERPL-MCNC: <0.3 MG/DL — LOW (ref 15–30)
SARS-COV-2 RNA SPEC QL NAA+PROBE: SIGNIFICANT CHANGE UP
SODIUM SERPL-SCNC: 140 MMOL/L — SIGNIFICANT CHANGE UP (ref 135–145)
SP GR SPEC: 1 — LOW (ref 1.01–1.05)
THC UR QL: NEGATIVE — SIGNIFICANT CHANGE UP
TOXICOLOGY SCREEN, DRUGS OF ABUSE, SERUM RESULT: SIGNIFICANT CHANGE UP
TSH SERPL-MCNC: 2.31 UIU/ML — SIGNIFICANT CHANGE UP (ref 0.27–4.2)
UROBILINOGEN FLD QL: SIGNIFICANT CHANGE UP
WBC # BLD: 3.97 K/UL — SIGNIFICANT CHANGE UP (ref 3.8–10.5)
WBC # FLD AUTO: 3.97 K/UL — SIGNIFICANT CHANGE UP (ref 3.8–10.5)

## 2023-06-16 PROCEDURE — 99285 EMERGENCY DEPT VISIT HI MDM: CPT

## 2023-06-16 PROCEDURE — 99285 EMERGENCY DEPT VISIT HI MDM: CPT | Mod: GC

## 2023-06-16 NOTE — ED ADULT NURSE REASSESSMENT NOTE - NS ED NURSE REASSESS COMMENT FT1
Pt ambulated to bathroom and back to  4 for sleep . Pt ambulates with steady gait, NAD, resp equal and unlabored.

## 2023-06-16 NOTE — ED BEHAVIORAL HEALTH ASSESSMENT NOTE - NSBHATTESTCOMMENTATTENDFT_PSY_A_CORE
28 yo male, residing with family in [place], unemployed, with psychiatric history schizophrenia, in current OP treatment with Dr. Chan at [facility], 2 prior inpatient psychiatric hospitalizations with last being 6 weeks ago in [place] for psychosis, no prior SA's, no prior NSSIB; no history of substance; no history of violence or legal issues BIB EMS due to being unable to care for self at home.    Patient evaluation is limited due to patient engagement. He is seen lying in bed covered in blanket and facing away from writer. He answers in one-word answers at low volume, with increased latency. He expresses depressed mood with constricted affect. Per collateral from patient's aunt, he has been isolative, internally preoccupied, and disorganized while not adhering to psychiatric medications. Per collateral, patient is unable to care for himself with basic needs such as eating, sleeping, or grooming. Of note, patient was also recently psychiatrically admitted 6 weeks ago due to psychosis symptoms. Though patient denies current psychosis symptoms, patient appears thought blocked and internally preoccupied, consistent with family's collateral. At this time, patient is at risk of harm to self due to inability to care for himself, and therefore meets criteria for involuntary psychiatric hospitalization.

## 2023-06-16 NOTE — ED BEHAVIORAL HEALTH ASSESSMENT NOTE - SUMMARY
26 yo male, residing with family in [place], unemployed, with psychiatric history schizophrenia, in current OP treatment with Dr. Chan at [facility], 2 prior inpatient psychiatric hospitalizations with last being 6 weeks ago in [place] for psychosis, no prior SA's, no prior NSSIB; no history of substance; no history of violence or legal issues BIB EMS due to being unable to care for self at home.    Patient evaluation is limited due to patient engagement. He is seen lying in bed covered in blanket and facing away from writer. He answers in one-word answers at low volume, with increased latency. He expresses depressed mood with constricted affect. Per collateral from patient's aunt, he has been isolative, internally preoccupied, and disorganized while not adhering to psychiatric medications. Per collateral, patient is unable to care for himself with basic needs such as eating, sleeping, or grooming. Of note, patient was also recently psychiatrically admitted 6 weeks ago due to psychosis symptoms. At this time, patient is at risk of harm to self due to inability to care for himself, and therefore meets criteria for involuntary psychiatric hospitalization. Admit on 2PC status.    Plan:  1. Admit on 2PC status  2. Psychiatric: Start risperidone 1mg QHS with goal of COCHRAN; Patient previously on Invega 117mg q4wks: PRN's: Zyprexa 5mg PO/IM PRN agitation/severe agitation  3. Medical: No known medical problems  4. Substance: No known substance use history  5. Dispo pending clinical improvement; Dr. Chan emailed regarding admission 28 yo male, residing with family in [place], unemployed, with psychiatric history schizophrenia, in current OP treatment with Dr. Chan at [facility], 2 prior inpatient psychiatric hospitalizations with last being 6 weeks ago in [place] for psychosis, no prior SA's, no prior NSSIB; no history of substance; no history of violence or legal issues BIB EMS due to being unable to care for self at home.    Patient evaluation is limited due to patient engagement. He is seen lying in bed covered in blanket and facing away from writer. He answers in one-word answers at low volume, with increased latency. He expresses depressed mood with constricted affect. Per collateral from patient's aunt, he has been isolative, internally preoccupied, and disorganized while not adhering to psychiatric medications. Per collateral, patient is unable to care for himself with basic needs such as eating, sleeping, or grooming. Of note, patient was also recently psychiatrically admitted 6 weeks ago due to psychosis symptoms. Though patient denies current psychosis symptoms, patient appears thought blocked and internally preoccupied, consistent with family's collateral. At this time, patient is at risk of harm to self due to inability to care for himself, and therefore meets criteria for involuntary psychiatric hospitalization. Admit on 2PC status.    Plan:  1. Admit on 2PC status  2. Psychiatric: Start risperidone 1mg QHS with goal of COCHRAN; Patient previously on Invega 117mg q4wks: PRN's: Zyprexa 5mg PO/IM PRN agitation/severe agitation  3. Medical: No known medical problems  4. Substance: No known substance use history  5. Dispo pending clinical improvement; Dr. Chan emailed regarding admission

## 2023-06-16 NOTE — ED BEHAVIORAL HEALTH ASSESSMENT NOTE - HPI (INCLUDE ILLNESS QUALITY, SEVERITY, DURATION, TIMING, CONTEXT, MODIFYING FACTORS, ASSOCIATED SIGNS AND SYMPTOMS)
28 yo male, residing with family in [place], unemployed, with psychiatric history schizophrenia, in current OP treatment with Dr. Chan at [facility], 2 prior inpatient psychiatric hospitalizations with last being 6 weeks ago in [place] for psychosis, no prior SA's, no prior NSSIB; no history of substance; no history of violence or legal issues BIB EMS due to being unable to care for self at home.    Patient seen and examined at bedside. Interview limited due to patient cooperation. Patient states "I'm depressed" but will explain further. He denies SI/HI. He denies AVH. He otherwise does not answer further questions regarding symptoms prior to presentation to the ED.    Per collateral from patient's aunt, Diane Headley ([phone]): The patient was in [place] for the past year with his mother. During this time, he was not regularly taking medications, with last COCHRAN administration being a year ago. About 6 weeks ago while in [place], the patient stopped eating, which he stated was because [employer] told him to. Due to poor PO intake, he collapsed, and was subsequently psychiatrically admitted for 1 month. Once discharged, the patient stopped taking medications. He then moved to [place] to live with his aunt. While at home in [place], he has been noted to decompensate. He is eating minimally, staying in bed most of the day, and has been seen talking and laughing to himself. She is unsure about the themes of the voices, as he becomes defensive if asked about them. His aunt has also found notes all over the patient's room, which are incoherent and unclear in content. He isolates himself throughout the day, and spends hours locked in the bathroom with the water running. He is awake through the night, either pacing or staying in the bathroom. Family has tried to encourage him to talk, but he becomes angry and isolates further. He denies feeling any depressed mood to family.    On Monday and Tuesday, family encouraged him to work with a cohen. He has not showered or changed his clothes since then, though his clothes are covered in cement. On Tuesday, someone told him to do something at work. He apparently screamed, punched the cement, and said he could not work any more. This morning, the patient was found by family lying on the floor, refusing to get up, which prompted family to call EMS .     He has not made any SI/HI statements. He has not been physically aggressive toward family. Family is advocating for psychiatric hospitalization.

## 2023-06-16 NOTE — ED ADULT NURSE NOTE - OBJECTIVE STATEMENT
Patient is a 28 yo male, h/x depression, schizophrenia, presenting via EMS after aunt called 9-1-1 for worsening depression. Per EMS, patient just returned from [place] where he has been off of his Invega injections for months. AAOx4, withdrawn, not endorsing any complaints, no signs of distress, denies SI/HI, hallucinations. Labs sent per orders. Pending EKG/urine sample.

## 2023-06-16 NOTE — ED BEHAVIORAL HEALTH NOTE - BEHAVIORAL HEALTH NOTE
Patient is accepted to [facility]. Dr. Nair is accepting. Nurse to nurse to be provided at [phone]. RN will arrange transport.  Writer called patient’s aunt Diane Headley ([phone]) to inform of transfer and left a message for call back. Patient is accepted to [facility]. Dr. Nair is accepting. Nurse to nurse to be provided at [phone]. RN will arrange transport.  Writer called patient’s aunt Diane Headley ([phone]) to inform of transfer and left a message for call back.  Worker called [employer] ( [phone]) and spoke to Zaina GOMES who provided auth # [id number]. worker faxed clinicals to [phone] and placed [facility] UR for follow up. Patient is accepted to [facility]. Dr. Nair is accepting. Nurse to nurse to be provided at [phone]. RN will arrange transport.  Writer called patient’s aunt Diane Headley ([phone]) to inform of transfer and left a message for call back.  Worker called [employer] ( [phone]) and spoke to Zaina GOMES who provided auth # [id number]. worker faxed clinicals to [phone] and placed [facility] UR for follow up. Legals placed in chart

## 2023-06-16 NOTE — ED PROVIDER NOTE - PROGRESS NOTE DETAILS
Consult psychiatry MALLORY Bolaños- Seen by psych, rec admission. Accepted to [facility]- Dr. Nair, Medically cleared for psychiatric admission

## 2023-06-16 NOTE — ED BEHAVIORAL HEALTH ASSESSMENT NOTE - DESCRIPTION
Lives with grandmother, grand aunt, aunt, and sister in [place]. Was living in [place] with mother until a few weeks ago. None reported Patient has been lying in his room with blanket covering him. No PRN's required. Patient has been lying in his room with blanket covering him. No PRN's required.    Vital Signs Last 24 Hrs  T(C): 36.3 (16 Jun 2023 14:02), Max: 36.7 (16 Jun 2023 10:41)  T(F): 97.4 (16 Jun 2023 14:02), Max: 98 (16 Jun 2023 10:41)  HR: 64 (16 Jun 2023 14:02) (62 - 64)  BP: 121/85 (16 Jun 2023 14:02) (121/85 - 128/72)  BP(mean): --  RR: 18 (16 Jun 2023 14:02) (18 - 18)  SpO2: 100% (16 Jun 2023 14:02) (100% - 100%)    Parameters below as of 16 Jun 2023 14:02  Patient On (Oxygen Delivery Method): room air

## 2023-06-16 NOTE — ED ADULT NURSE REASSESSMENT NOTE - NS ED NURSE REASSESS COMMENT FT1
Pt calm made aware of admission to [facility] report giving ems activated enroute eval on going.

## 2023-06-16 NOTE — ED BEHAVIORAL HEALTH ASSESSMENT NOTE - UNABLE TO CARE FOR SELF DETAILS
Patient not sleeping, eating, or caring for hygiene appropriately in past week due to psychosis symptoms

## 2023-06-16 NOTE — ED BEHAVIORAL HEALTH ASSESSMENT NOTE - CURRENT MEDICATION
No current medications.    Per chart review, patient was on Invega 6mg QHS and Invega Sustenna 117mg q4wks in 2022.

## 2023-06-16 NOTE — ED PROVIDER NOTE - NS ED ATTENDING STATEMENT MOD
Attending Only This was a shared visit with the LUANN. I reviewed and verified the documentation and independently performed the documented:

## 2023-06-16 NOTE — ED BEHAVIORAL HEALTH ASSESSMENT NOTE - NSBHMSESPABN_PSY_A_CORE
Providing one-word answers at barely audible volume./Soft volume/Slowed rate/Decreased productivity/Increased latency

## 2023-06-16 NOTE — ED PROVIDER NOTE - OBJECTIVE STATEMENT
Dr Tate  27-year-old male history of schizophrenia brought in by EMS from home chief complaint of more depressed than usual.  Spoke with EMS and and on the phone, states since Monday (4 days ago)) patient has not showered, been sleeping on the floor, refusing to eat, and talking irrational friends.  She states he gets very agitated and frustrated at home but has not been aggressive.  No concern for SI or HI.  States he is due risperidone IM next week (4 days from now) but she is concerned that he is decompensating.  Patient states he did not eat since yesterday.  But denies any chest pain denies any abdominal pain denies any nausea, vomiting or diarrhea denies any urinary complaints denies any SI or HI.  Denies any trauma.  No history of substance abuse.

## 2023-06-16 NOTE — ED BEHAVIORAL HEALTH ASSESSMENT NOTE - DETAILS
Pending bed availability Discussed with patient's aunt Diane Headley Patient not forthcoming regarding symptoms. Per transfer protocol

## 2023-06-16 NOTE — ED BEHAVIORAL HEALTH ASSESSMENT NOTE - GENERAL APPEARANCE
Patient covered in blanket facing away from writer with only head showing outside of blanket./Unable to assess

## 2023-06-16 NOTE — ED BEHAVIORAL HEALTH ASSESSMENT NOTE - ADDITIONAL DETAILS ALL
Per collateral from family, patient has not been any SI statements or taken any actions toward suicide.

## 2023-06-16 NOTE — ED BEHAVIORAL HEALTH ASSESSMENT NOTE - PSYCHIATRIC ISSUES AND PLAN (INCLUDE STANDING AND PRN MEDICATION)
Start risperidone 1mg QHS with goal of COCHRAN; Patient previously on Invega 117mg q4wks: PRN's: Zyprexa 5mg PO/IM PRN agitation/severe agitation Start risperidone 1mg QHS with goal of COCHRAN; Patient previously on Invega 117mg q4wks; PRN's: Zyprexa 5mg PO/IM PRN agitation/severe agitation

## 2023-06-16 NOTE — ED BEHAVIORAL HEALTH ASSESSMENT NOTE - OTHER PAST PSYCHIATRIC HISTORY (INCLUDE DETAILS REGARDING ONSET, COURSE OF ILLNESS, INPATIENT/OUTPATIENT TREATMENT)
In OP treatment with Dr. Chan at [facility], but only re-initiated recently after being [place] for a year.  2 prior IP hospitalizations, with last being 6 weeks ago in [place]  No prior SA's, NSSIB

## 2023-06-16 NOTE — ED ADULT TRIAGE NOTE - CHIEF COMPLAINT QUOTE
as per EMS family called 911 because pt is feeling depressed more than usual, not getting out of bed, pt avoids eye contact, internally preoccupied, answers to questions appropriately. denies S/I, H/I or AVH at present. calm and cooperative. hx of Depression and schizophrenia in the past.

## 2023-09-04 ENCOUNTER — INPATIENT (INPATIENT)
Facility: HOSPITAL | Age: 28
LOS: 23 days | Discharge: HOME | End: 2023-09-28
Attending: STUDENT IN AN ORGANIZED HEALTH CARE EDUCATION/TRAINING PROGRAM | Admitting: PSYCHIATRY & NEUROLOGY
Payer: MEDICAID

## 2023-09-04 VITALS
OXYGEN SATURATION: 100 % | SYSTOLIC BLOOD PRESSURE: 131 MMHG | HEART RATE: 81 BPM | RESPIRATION RATE: 18 BRPM | TEMPERATURE: 98 F | DIASTOLIC BLOOD PRESSURE: 78 MMHG

## 2023-09-04 DIAGNOSIS — F20.9 SCHIZOPHRENIA, UNSPECIFIED: ICD-10-CM

## 2023-09-04 LAB
AMPHET UR-MCNC: NEGATIVE — SIGNIFICANT CHANGE UP
ANION GAP SERPL CALC-SCNC: 14 MMOL/L — SIGNIFICANT CHANGE UP (ref 7–14)
APAP SERPL-MCNC: <10 UG/ML — LOW (ref 15–25)
APPEARANCE UR: CLEAR — SIGNIFICANT CHANGE UP
BARBITURATES UR SCN-MCNC: NEGATIVE — SIGNIFICANT CHANGE UP
BASOPHILS # BLD AUTO: 0.04 K/UL — SIGNIFICANT CHANGE UP (ref 0–0.2)
BASOPHILS NFR BLD AUTO: 0.8 % — SIGNIFICANT CHANGE UP (ref 0–2)
BENZODIAZ UR-MCNC: NEGATIVE — SIGNIFICANT CHANGE UP
BILIRUB UR-MCNC: NEGATIVE — SIGNIFICANT CHANGE UP
BUN SERPL-MCNC: 8 MG/DL — SIGNIFICANT CHANGE UP (ref 7–23)
CALCIUM SERPL-MCNC: 9.5 MG/DL — SIGNIFICANT CHANGE UP (ref 8.4–10.5)
CHLORIDE SERPL-SCNC: 99 MMOL/L — SIGNIFICANT CHANGE UP (ref 98–107)
CO2 SERPL-SCNC: 25 MMOL/L — SIGNIFICANT CHANGE UP (ref 22–31)
COCAINE METAB.OTHER UR-MCNC: NEGATIVE — SIGNIFICANT CHANGE UP
COLOR SPEC: YELLOW — SIGNIFICANT CHANGE UP
CREAT SERPL-MCNC: 0.89 MG/DL — SIGNIFICANT CHANGE UP (ref 0.5–1.3)
CREATININE URINE RESULT, DAU: 93 MG/DL — SIGNIFICANT CHANGE UP
DIFF PNL FLD: NEGATIVE — SIGNIFICANT CHANGE UP
EGFR: 119 ML/MIN/1.73M2 — SIGNIFICANT CHANGE UP
EOSINOPHIL # BLD AUTO: 0 K/UL — SIGNIFICANT CHANGE UP (ref 0–0.5)
EOSINOPHIL NFR BLD AUTO: 0 % — SIGNIFICANT CHANGE UP (ref 0–6)
ETHANOL SERPL-MCNC: <10 MG/DL — SIGNIFICANT CHANGE UP
FLUAV AG NPH QL: SIGNIFICANT CHANGE UP
FLUBV AG NPH QL: SIGNIFICANT CHANGE UP
GIANT PLATELETS BLD QL SMEAR: PRESENT — SIGNIFICANT CHANGE UP
GLUCOSE SERPL-MCNC: 126 MG/DL — HIGH (ref 70–99)
GLUCOSE UR QL: NEGATIVE MG/DL — SIGNIFICANT CHANGE UP
HCT VFR BLD CALC: 43.5 % — SIGNIFICANT CHANGE UP (ref 39–50)
HGB BLD-MCNC: 14 G/DL — SIGNIFICANT CHANGE UP (ref 13–17)
IANC: 3.32 K/UL — SIGNIFICANT CHANGE UP (ref 1.8–7.4)
KETONES UR-MCNC: NEGATIVE MG/DL — SIGNIFICANT CHANGE UP
LEUKOCYTE ESTERASE UR-ACNC: NEGATIVE — SIGNIFICANT CHANGE UP
LYMPHOCYTES # BLD AUTO: 0.65 K/UL — LOW (ref 1–3.3)
LYMPHOCYTES # BLD AUTO: 12.8 % — LOW (ref 13–44)
MCHC RBC-ENTMCNC: 26.7 PG — LOW (ref 27–34)
MCHC RBC-ENTMCNC: 32.2 GM/DL — SIGNIFICANT CHANGE UP (ref 32–36)
MCV RBC AUTO: 82.9 FL — SIGNIFICANT CHANGE UP (ref 80–100)
METHADONE UR-MCNC: NEGATIVE — SIGNIFICANT CHANGE UP
MONOCYTES # BLD AUTO: 0.7 K/UL — SIGNIFICANT CHANGE UP (ref 0–0.9)
MONOCYTES NFR BLD AUTO: 13.7 % — SIGNIFICANT CHANGE UP (ref 2–14)
NEUTROPHILS # BLD AUTO: 3.58 K/UL — SIGNIFICANT CHANGE UP (ref 1.8–7.4)
NEUTROPHILS NFR BLD AUTO: 70.1 % — SIGNIFICANT CHANGE UP (ref 43–77)
NITRITE UR-MCNC: NEGATIVE — SIGNIFICANT CHANGE UP
OPIATES UR-MCNC: NEGATIVE — SIGNIFICANT CHANGE UP
OXYCODONE UR-MCNC: NEGATIVE — SIGNIFICANT CHANGE UP
PCP SPEC-MCNC: SIGNIFICANT CHANGE UP
PCP UR-MCNC: NEGATIVE — SIGNIFICANT CHANGE UP
PH UR: 6.5 — SIGNIFICANT CHANGE UP (ref 5–8)
PLAT MORPH BLD: ABNORMAL
PLATELET # BLD AUTO: 161 K/UL — SIGNIFICANT CHANGE UP (ref 150–400)
PLATELET COUNT - ESTIMATE: NORMAL — SIGNIFICANT CHANGE UP
POTASSIUM SERPL-MCNC: 3 MMOL/L — LOW (ref 3.5–5.3)
POTASSIUM SERPL-SCNC: 3 MMOL/L — LOW (ref 3.5–5.3)
PROT UR-MCNC: NEGATIVE MG/DL — SIGNIFICANT CHANGE UP
RBC # BLD: 5.25 M/UL — SIGNIFICANT CHANGE UP (ref 4.2–5.8)
RBC # FLD: 13.8 % — SIGNIFICANT CHANGE UP (ref 10.3–14.5)
RBC BLD AUTO: NORMAL — SIGNIFICANT CHANGE UP
RSV RNA NPH QL NAA+NON-PROBE: SIGNIFICANT CHANGE UP
SALICYLATES SERPL-MCNC: <0.3 MG/DL — LOW (ref 15–30)
SARS-COV-2 RNA SPEC QL NAA+PROBE: SIGNIFICANT CHANGE UP
SODIUM SERPL-SCNC: 138 MMOL/L — SIGNIFICANT CHANGE UP (ref 135–145)
SP GR SPEC: 1.01 — SIGNIFICANT CHANGE UP (ref 1–1.03)
THC UR QL: NEGATIVE — SIGNIFICANT CHANGE UP
TSH SERPL-MCNC: 1.14 UIU/ML — SIGNIFICANT CHANGE UP (ref 0.27–4.2)
UROBILINOGEN FLD QL: 0.2 MG/DL — SIGNIFICANT CHANGE UP (ref 0.2–1)
VARIANT LYMPHS # BLD: 2.6 % — SIGNIFICANT CHANGE UP (ref 0–6)
WBC # BLD: 5.1 K/UL — SIGNIFICANT CHANGE UP (ref 3.8–10.5)
WBC # FLD AUTO: 5.1 K/UL — SIGNIFICANT CHANGE UP (ref 3.8–10.5)

## 2023-09-04 PROCEDURE — 99285 EMERGENCY DEPT VISIT HI MDM: CPT

## 2023-09-04 RX ORDER — RISPERIDONE 4 MG/1
2 TABLET ORAL AT BEDTIME
Refills: 0 | Status: DISCONTINUED | OUTPATIENT
Start: 2023-09-04 | End: 2023-09-05

## 2023-09-04 RX ORDER — HALOPERIDOL DECANOATE 100 MG/ML
5 INJECTION INTRAMUSCULAR ONCE
Refills: 0 | Status: DISCONTINUED | OUTPATIENT
Start: 2023-09-04 | End: 2023-09-05

## 2023-09-04 RX ORDER — POTASSIUM CHLORIDE 20 MEQ
40 PACKET (EA) ORAL EVERY 4 HOURS
Refills: 0 | Status: COMPLETED | OUTPATIENT
Start: 2023-09-04 | End: 2023-09-04

## 2023-09-04 RX ADMIN — Medication 40 MILLIEQUIVALENT(S): at 23:56

## 2023-09-04 RX ADMIN — Medication 40 MILLIEQUIVALENT(S): at 16:46

## 2023-09-04 NOTE — ED ADULT NURSE NOTE - NS ED NURSE DISCH DISPOSITION
For information on Fall & Injury Prevention, visit: [email or web address] OR  [email or web address] OR  https://www.cdc.gov/steadi/patient.html
Admitted

## 2023-09-04 NOTE — ED BEHAVIORAL HEALTH ASSESSMENT NOTE - NSBHMSESPABN_PSY_A_CORE
Soft volume/Slowed rate/Decreased productivity/Increased latency poverty of content of speech/Soft volume/Slowed rate/Decreased productivity/Increased latency

## 2023-09-04 NOTE — ED BEHAVIORAL HEALTH ASSESSMENT NOTE - SUMMARY
Patient is a 29 y/o M, single, non-caregiver, lives at home with aunt and aunt's family, PPH schizophrenia, 3 prior inpatient psychiatric hospitalizations (2018 [facility], 2023 [place], last at [facility] in June 2023), followed by Dr. Chan at [facility] for outpatient care (no showed August appt), taking Invega sustenna, no history of SA or violence, no known history of substance use, who presents to the [facility] ED on 9/4/2023 brought in by ROCKY and JUSTIN after he was discovered pouring water on electrical outlets at his home. Please see  note for collateral from pt's aunt. Briefly, family has observed patient to be more irritable, withdrawn, and easily agitated. They are concerned about psychiatric decompensation and advocating for admission.     On evaluation patient appears severely guarded and paranoid, fearful. Interview strained. Patient discloses paranoid delusions about family, is fearful that his food is being poisoned. Has passive SI with no plan or intent, and thoughts of hurting family which he will not elaborate on. Patient is unable to care for himself and requires involuntary inpatient psychiatric hospitalization for stabilization and safety.    Plan:  1.	Legals: admit on 9.27  2.	Safety: routine observation, denies SI/HI/I/P on the unit. PRNs: Ativan/Haldol/Benadryl PO/IM  3.	Psychiatry: Start Risperdal 2mg qhs (to Augment invega 117mg q4w)  4.	Group, milieu, individual therapy as appropriate.  5.	Medical: no acute medical issues, no significant PMH.  Admission labs WNL.  6.	Dispo: pending clinical improvement.  Patient continues to require inpatient hospitalization for stabilization and safety. Patient is a 27 y/o M, single, non-caregiver, lives at home with aunt and aunt's family, PPH schizophrenia, 3 prior inpatient psychiatric hospitalizations (2018 [facility], 2023 [place], last at [facility] in June 2023), followed by Dr. Chan at [facility] for outpatient care (no showed August appt), taking Invega sustenna, no history of SA or violence, no known history of substance use, who presents to the [facility] ED on 9/4/2023 brought in by ROCKY and JUSTIN after he was discovered pouring water on electrical outlets at his home. Please see  note for collateral from pt's aunt. Briefly, family has observed patient to be more irritable, withdrawn, and easily agitated. They are concerned about psychiatric decompensation and advocating for admission.     On evaluation patient appears severely guarded and paranoid, fearful. Interview strained. Patient discloses paranoid delusions about family, is fearful that his food is being poisoned. Has passive SI with no plan or intent, and thoughts of hurting family which he will not elaborate on. Patient is unable to care for himself and requires involuntary inpatient psychiatric hospitalization for stabilization and safety.    Plan:  1.	Legals: admit on 9.27  2.	Safety: routine observation, denies active SI. PRNs: Ativan/Haldol/Benadryl PO/IM  3.	Psychiatry: Start Risperdal 2mg qhs (to Augment invega 117mg q4w)  4.	Group, milieu, individual therapy as appropriate.  5.	Medical: no acute medical issues, no significant PMH.  Admission labs WNL.  6.	Dispo: pending clinical improvement.  Patient continues to require inpatient hospitalization for stabilization and safety. Patient is a 29 y/o M, single, non-caregiver, lives at home with aunt and aunt's family, PPH schizophrenia, 3 prior inpatient psychiatric hospitalizations (2018 [facility], 2023 [place], last at [facility] in June 2023), followed by Dr. Chan at [facility] for outpatient care (no showed August appt), taking Invega sustenna, no history of SA or violence, no known history of substance use, who presents to the [facility] ED on 9/4/2023 brought in by ROCKY and JUSTIN after he was discovered pouring water on electrical outlets at his home. Please see  note for collateral from pt's aunt. Briefly, family has observed patient to be more irritable, withdrawn, and easily agitated. They are concerned about psychiatric decompensation and advocating for admission.     On evaluation patient appears severely guarded and paranoid, fearful. Interview strained. Patient discloses paranoid delusions about family, is fearful that his food is being poisoned. Has passive SI with no plan or intent, and thoughts of hurting family which he will not elaborate on. Patient is unable to care for himself and requires involuntary inpatient psychiatric hospitalization for stabilization and safety.    Plan:  1.	Legals: admit on 9.27  2.	Safety: routine observation, denies active SI. PRNs: Ativan/Haldol/Benadryl PO/IM  3.	Psychiatry: Start Risperdal 2mg qhs (to Augment invega 117mg q4w)  4.	Group, milieu, individual therapy as appropriate.  5.	Medical: Patient hypokalemic, repletion ordered. Requires repeat BMP 9/5/2023  6.	Dispo: pending clinical improvement.  Patient continues to require inpatient hospitalization for stabilization and safety.

## 2023-09-04 NOTE — ED BEHAVIORAL HEALTH NOTE - BEHAVIORAL HEALTH NOTE
per CVM: the Pt was given Invega Sustenna 117mg IM [id number] last 08/21/2023, 11:20 AM (over his right deltoid)  - during that encounter, he was noted to have flat affect, had poor eye contact and minimally engaged  - seemed to be "internally preoccupied"; no SI/ No HI    last E&M conducted by Dr ANATOLIY Chan back in 5/18/2023 wherein during that encounter following his recent [facility] admission for mgt of depression/psychosis/ catatonia he reported having had depressive symptoms. Pt did well on risperdal + prozac (40mg daily). however after he was discharged from [facility], he self D/C'ed prozac as he felt that Prozac was causing blurry vision. Pt was deemed stable, without psychotic or mood symptoms during the encounter with Dr ANATOLIY Chan.     Pt missed his 8/4/2023 scheduled appt with Dr Chan

## 2023-09-04 NOTE — ED ADULT NURSE NOTE - OBJECTIVE STATEMENT
Pt Hx: Schizophrenia. Pt from home as per EMS was pouring water in outlets and making a mess of the house, as per EMS family states Pt is unpredictable. EMS states Pt was calm and cooperative on transport. Pt calm making poor eye contact, speaking softly states he is depressed and having disputes with family provoking his behavior. Denies; S/I, H/I, hallucinations, paranoia, etoh/drugs use.

## 2023-09-04 NOTE — ED ADULT NURSE NOTE - CHIEF COMPLAINT QUOTE
patient brought by JUSTIN and [facility] for patient pouring water in electrical outlets. Patient denies chest pain sob n/v. Patient denies SI/HI. Pt denies hallucinations. Per ems patient was having outbreaks.

## 2023-09-04 NOTE — ED BEHAVIORAL HEALTH ASSESSMENT NOTE - HIGH RISK FOR ASSAULT DETAILS
runs risk for potential assault given ongoing psychotic decompensation + reportedly emotional dysregulation - which is psychotically driven

## 2023-09-04 NOTE — ED BEHAVIORAL HEALTH ASSESSMENT NOTE - CURRENT PASSIVE IDEATION:
Your Personalized Prevention Plan Services (PPPS)    Preventive Services Checklist (Assumes Average Risk Unless Otherwise Noted):    Health Maintenance Topics with due status: Overdue       Topic Date Due    Annual Falls Risk Screening 01/01/2021    Colonoscopy 04/19/2021    Influenza Vaccine 08/01/2021    Medicare Annual Wellness Visit 09/29/2021     Health Maintenance Topics with due status: Not Due       Topic Last Completion Date    DTaP, Tdap, and Td Vaccines 04/23/2021     Health Maintenance Topics with due status: Completed       Topic Last Completion Date    Pneumococcal 10/31/2016    Pneumococcal (65 years and older) 10/31/2016    Hepatitis C Screening 02/21/2019    Zoster Vaccine 12/28/2019    COVID-19 Vaccine 03/06/2021     Health Maintenance Topics with due status: Aged Out       Topic Date Due    Meningococcal ACWY Aged Out    HIB Vaccines Aged Out    IPV Vaccines Aged Out    HPV Vaccines Aged Out       You May Be Eligible for These Additional Preventive Services   (Assumes Average Risk Unless Otherwise Noted)  Diabetes Screening Any 1 risk factor: hypertension, dyslipidemia, obesity, high glucose; or Any 2 risk factors: >=66yo, overweight, family history diabetes (covered every 6 months)   Hepatitis C Screening Any 1 risk factor: 1) blood transfusion before 1992,   2) current or past injection drug use (annually for high risk; if born between [birth year], see above for status).   Vaccine: Hepatitis B As necessary if at-risk: hemophilia, ESRD, diabetes, living with individual infected with hep B, healthcare worker with frequent contact with blood/bodily fluids (series covered once)   Sexually Transmitted Diseases (STDs) As necessary chlamydia, gonorrhea, syphilis, hepatitis B (covered annually)  HIV if any 1 risk factor present: 1) <14yo or >66yo and at increased risk or 2) 15-66yo and ask for it (covered annually)   Lung Cancer Screening Low dose chest CT if all 3 risk factors: 1)  55-78yo, 2) smoker or quit within last 15y, 3) >=30 pack years (covered annually).  No results found for this or any previous visit.       Cholesterol Screening No signs or symptoms of cardiovascular disease (screening covered every 5 years).     Prostate Cancer Screening >= 49yo if patient desires test after weighting potential harms and benefits (covered annually)     Abdominal Aortic Aneurysm Screening Any 1 risk factor: 1) family history of AAA or 2) 65-76yo and smoked 100+ cigarettes in a lifetime (covered once).   No results found for this or any previous visit.   No results found for this or any previous visit.           Health Risk Factors with Personalized Education:  ----------------------------------------------------------------------------------------------------------------------  Stress management:  Understanding Your Stress  · Think about how you know when you’re stressed.  · Think about how your thoughts or behaviors are different when you’re stressed.  · Think about what triggers stress for you.  · Think about how you handle stress, and whether you’re making unhealthy choices in response to stress (like smoking, drinking alcohol or overeating).  Managing Stress  · Take a break from the stressful situation.  · Reduce your stress levels by exercising, talking with family/friends, ensuring adequate sleep.  · Consider meditation or yoga.  · Make sure you plan time to do things you enjoy.  · Let your PCP know if you’re having problems limiting your stress.  ----------------------------------------------------------------------------------------------------------------------  Controlling Your Blood Pressure  · Maintain a normal weight (body mass index between 18.5 and 24.9).  · Eat more fruit, vegetables and low-fat dairy.  · Eat less saturated fat and total fat.  · Lower your sodium (salt) intake.  Try to stay under 1500 mg per day, but if you cannot get your intake to be that low, at least lower it by  1000 mg.  · Stay active.  Try to get at least 90 to 150 minutes of exercise per week.  Try brisk walking, swimming, bicycling or dancing.  · Limit alcohol intake.  When you do consume alcohol, drink no more than 2 drinks per day.  · If you have been prescribed medication, take it regularly and exactly as prescribed.  Let your PCP know if you have any problems or questions about your medication.  · Check your blood pressure at home or at the store.  Write down your readings and share them with your PCP  ----------------------------------------------------------------------------------------------------------------------  Controlling Your Cholesterol  · Reduce the amount of saturated and trans fat in your diet.  Limit intake of red meat.  Consume only low-fat or non-fat/skim dairy.  Limit fried food.  Cook with vegetable oils.  · Reduce your intake of sugary foods, sugary drinks and alcohol.  · Eat a diet high in fruit, vegetables and whole grains.  · Get protein from fish, poultry and a small portion of nuts.  · Stay active.  Try to get at least 90 to 150 minutes of exercise per week.  Try brisk walking, swimming, bicycling or dancing.  · Maintain a healthy weight by balancing your diet and exercise.  · If you have been prescribed medication, take it regularly and exactly as prescribed. Let your PCP know if you have any problems or questions about your medication.  · It’s important to know your cholesterol numbers.  When recommended by your PCP, get the cholesterol blood test.  ----------------------------------------------------------------------------------------------------------------------  Reducing Your Risk of Falls  · Tell your PCP if any of your medications make you feel tired, dizzy, lightheaded or off-balance.  · Maintain coordination, flexibility and balance by ensuring regular physical activity.  · Limit alcohol intake to 1 drink per day.  Consider avoiding all alcohol intake.  · Ensure good vision.   Visit an ophthalmologist or optometrist regularly for vision screening or to make sure your glasses / contact lens prescription is correct.  If you need glasses or contacts, wear them.  When you get new glasses or contacts, take time to get used to them.  Do not wear sunglasses or tinted lenses when indoors.  · Ensure good hearing.  Have your hearing checked if you are having trouble hearing, or family and friends think you cannot hear them.  If you need a hearing aid, be sure it fits well and wear it.  · Get enough rest.  Ensure about 7-9 hours of sleep every day.  · Get up slowly from your bed or chairs.  Do not start walking until you are sure you feel steady.  · Wear non-skid, rubber-soled, low-heeled shoes.  Do not walk in socks, or in shoes and slippers with smooth soles.  · If your PCP or therapist recommends using a cane or walker, use it regularly.  · Make your home safer.  Increase lighting throughout the house, especially at the top and bottom of stairs.  Ensure lighting is easily turned on when getting up in the middle of the night.  Make sure there are two secure rails on all stairs.  Install grab bars in the bathtub / shower and near the toilet.  Consider using a shower chair and / or a hand-held shower.  · Spread sand or salt on icy surfaces.  Beware of wet surfaces, which can be icy.  · Tell your PCP if you have fallen.   None known Yes

## 2023-09-04 NOTE — ED BEHAVIORAL HEALTH ASSESSMENT NOTE - DESCRIPTION
Patient calm and cooperative in the ED. Note Patient calm and cooperative in the ED. Noted to be guarded and RIS    Vital Signs Last 24 Hrs  T(C): 36.6 (04 Sep 2023 12:22), Max: 36.6 (04 Sep 2023 12:22)  T(F): 97.8 (04 Sep 2023 12:22), Max: 97.8 (04 Sep 2023 12:22)  HR: 81 (04 Sep 2023 12:22) (81 - 81)  BP: 131/78 (04 Sep 2023 12:22) (131/78 - 131/78)  BP(mean): --  RR: 18 (04 Sep 2023 12:22) (18 - 18)  SpO2: 100% (04 Sep 2023 12:22) (100% - 100%)    Parameters below as of 04 Sep 2023 12:22  Patient On (Oxygen Delivery Method): room air Denies Lives with grandmother, grand aunt, aunt, and sister in [place]. Not in school Patient calm and cooperative in the ED. Noted to be guarded and RIS  Pt's potassium came back low. Repletion ordered.    Vital Signs Last 24 Hrs  T(C): 36.6 (04 Sep 2023 12:22), Max: 36.6 (04 Sep 2023 12:22)  T(F): 97.8 (04 Sep 2023 12:22), Max: 97.8 (04 Sep 2023 12:22)  HR: 81 (04 Sep 2023 12:22) (81 - 81)  BP: 131/78 (04 Sep 2023 12:22) (131/78 - 131/78)  BP(mean): --  RR: 18 (04 Sep 2023 12:22) (18 - 18)  SpO2: 100% (04 Sep 2023 12:22) (100% - 100%)    Parameters below as of 04 Sep 2023 12:22  Patient On (Oxygen Delivery Method): room air

## 2023-09-04 NOTE — ED BEHAVIORAL HEALTH ASSESSMENT NOTE - HPI (INCLUDE ILLNESS QUALITY, SEVERITY, DURATION, TIMING, CONTEXT, MODIFYING FACTORS, ASSOCIATED SIGNS AND SYMPTOMS)
Patient is a 29 y/o M, single, non-caregiver, lives at home with ***, PPH schizophrenia, multiple prior inpatient psychiatric hospitalizations (last at [facility] in June 2023), followed by Dr. Chan at \[facility]\"" for outpatient care (no showed August appt), taking Invega sustenna, no history ***SA or violence, *** substance, who presents to the [facility] ED on 9/4/2023 brought in by *** for concern for pychiatric decompensation.    Patient's outpatient notes in avatar were reviewed. Patient did not come to his appointment on 8/4/2023. Patient was last seen at \[facility]\"" clinic by Dr. Dey on 5/28/2023. At this appointment, patient denies delusions and hallucinations, and was experiencing memory problems in school. Patient was maintained on Invega Sustenna 117 mg q4w. Patient is a 29 y/o M, single, non-caregiver, lives at home with ***, PPH schizophrenia, 3 prior inpatient psychiatric hospitalizations (2018 [facility], 2023 [place], last at [facility] in June 2023), followed by Dr. Chan at [facility] for outpatient care (no showed August appt), taking Invega sustenna, no history ***SA or violence, *** substance, who presents to the [facility] ED on 9/4/2023 brought in by *** for concern for psychiatric decompensation.    Patient's outpatient notes in avatar were reviewed. Patient did not come to his appointment on 8/4/2023. Patient was last seen at [facility] clinic by Dr. Dey on 5/28/2023. At this appointment, patient denies delusions and hallucinations, and was experiencing memory problems in school. Patient was maintained on Invega Sustenna 117 mg q4w. Patient is a 29 y/o M, single, non-caregiver, lives at home with aunt and aunt's family, PPH schizophrenia, 3 prior inpatient psychiatric hospitalizations (2018 [facility], 2023 [place], last at [facility] in June 2023), followed by Dr. Chan at [facility] for outpatient care (no showed August appt), taking Invega sustenna, no history of SA or violence, no known history of substance use, who presents to the [facility] ED on 9/4/2023 brought in by ROCKY and JUSTIN after he was discovered pouring water on electrical outlets. Please see  note for collateral from pt's aunt. Briefly, family has observed ***. They are concerned about psychiatric decompensation and advocating for admission.     Patient's outpatient notes in avatar were reviewed. Patient did not come to his appointment on 8/4/2023. Patient was last seen at [facility] clinic by Dr. Dey on 5/28/2023. At this appointment, patient denies delusions and hallucinations, and was experiencing memory problems in school. Patient was maintained on Invega Sustenna 117 mg q4w. Patient is a 29 y/o M, single, non-caregiver, lives at home with aunt and aunt's family, PPH schizophrenia, 3 prior inpatient psychiatric hospitalizations (2018 [facility], 2023 [place], last at [facility] in June 2023), followed by Dr. Chan at [facility] for outpatient care (no showed August appt), taking Invega sustenna, no history of SA or violence, no known history of substance use, who presents to the [facility] ED on 9/4/2023 brought in by ROCKY and JUSTIN after he was discovered pouring water on electrical outlets. Please see  note for collateral from pt's aunt. Briefly, family has observed ***. They are concerned about psychiatric decompensation and advocating for admission.     On evaluation patient appears severely guarded and paranoid, fearful. Interview strained. Patient responds to each question with nodding head or one word. States he has been "uncomfortable" at home because his family is making him "talk to strangers." States these strangers ask him questions. Unable to provide further details other than stating he is worried he will fight with these people. Patient states he has been feeling "paranoid" and notes that he has fears his family is poisoning his food. He endorses passive SI but denies intent or plan. Endorses thoughts of hurting his family but refuses to give further details. Patient is aware of his medication and diagnosis but when asked how schizophrenia affects him, he only says, "Dopamine." Patient states he is eating, sleeping, drinking, showering, but later states that he has been "vomiting water." Patient was cooperative with motor exam.    Patient's outpatient notes in avatar were reviewed. Patient did not come to his appointment on 8/4/2023. Patient was last seen at [facility] clinic by Dr. Dey on 5/28/2023. At this appointment, patient denies delusions and hallucinations, and was experiencing memory problems in school. Patient was maintained on Invega Sustenna 117 mg q4w. Patient is a 29 y/o M, single, non-caregiver, lives at home with aunt and aunt's family, PPH schizophrenia, 3 prior inpatient psychiatric hospitalizations (2018 [facility], 2023 [place], last at [facility] in June 2023), followed by Dr. Chan at [facility] for outpatient care (no showed August appt), taking Invega sustenna, no history of SA or violence, no known history of substance use, who presents to the [facility] ED on 9/4/2023 brought in by ROCKY and JUSTIN after he was discovered pouring water on electrical outlets. Please see  note for collateral from pt's aunt. Briefly, family has observed patient to be withdrawn, easily agitated/irritated, and behaving bizarrely (pouding water in outlets, mopping it up, breaking things around house). They are concerned about psychiatric decompensation and advocating for admission.     On evaluation, patient appears severely guarded and paranoid, fearful. Interview strained. Patient responds to each question with nodding head or one word. States he has been "uncomfortable" at home because his family is making him "talk to strangers." States these strangers ask him questions. Unable to provide further details other than stating he is worried he will fight with these people. Patient states he has been feeling "paranoid" and notes that he has fears his family is poisoning his food. He states he is depressed and endorses passive SI but denies intent or plan. Endorses thoughts of hurting his family but refuses to give further details. Patient is aware of his medication and diagnosis but when asked how schizophrenia affects him, he only says, "Dopamine." Patient states he is eating, sleeping, drinking, showering, but later states that he has been "vomiting water." Patient was cooperative with motor exam. Patient denies AVH.     [facility]. Patient did not come to his appointment on 8/4/2023. Patient was last seen at [facility] clinic by Dr. Dey on 5/28/2023. At this appointment, patient denies delusions and hallucinations, and was experiencing memory problems in school. Patient was maintained on Invega Sustenna 117 mg q4w. Patient is a 29 y/o M, single, non-caregiver, lives at home with aunt and aunt's family, PPH schizophrenia, 3 prior inpatient psychiatric hospitalizations (2018 [facility], 2023 [place], last at [facility] in June 2023), followed by Dr. Chan at [facility] for outpatient care (no showed August appt), taking Invega sustenna, no history of SA or violence, no known history of substance use, who presents to the [facility] ED on 9/4/2023 brought in by ROCKY and JUSTIN after he was discovered pouring water on electrical outlets in his home. Please see  note for collateral from pt's aunt. Briefly, family has observed patient to be withdrawn, easily agitated/irritated, and behaving bizarrely (pouring water in outlets, mopping it up, breaking things around house). They are concerned about psychiatric decompensation and advocating for admission.     On evaluation, patient appears severely guarded and paranoid, fearful. Interview strained. Patient responds to each question with nodding head or one word. States he has been "uncomfortable" at home because his family is making him "talk to strangers." States these strangers ask him questions. Unable to provide further details other than stating he is worried he will fight with these people. Patient states he has been feeling "paranoid" and notes that he has fears his family is poisoning his food. He states he is depressed and endorses passive SI but denies intent or plan. Endorses thoughts of hurting his family but refuses to give further details. Patient is aware of his medication and diagnosis but when asked how schizophrenia affects him, he only says, "Dopamine." Patient states he is eating, sleeping, drinking, showering, but later states that he has been "vomiting water." Patient was cooperative with motor exam. Patient denies AVH.     [facility]. Patient did not come to his appointment on 8/4/2023. Patient was last seen at [facility] clinic by Dr. Dey on 5/28/2023. At this appointment, patient denies delusions and hallucinations, and was experiencing memory problems in school. Patient was maintained on Invega Sustenna 117 mg q4w.

## 2023-09-04 NOTE — ED PROVIDER NOTE - CLINICAL SUMMARY MEDICAL DECISION MAKING FREE TEXT BOX
28 yo male, residing with family in [place], unemployed, with psychiatric history schizophrenia, in current OP treatment with Dr. Chan at [facility] with 3 prior inpatient psychiatric hospitalizations the last to [facility] in June with depression no prior SA's, history of violence or legal issues PTED  BIB EMS/PD after being found pouring water in electrical outlets. Patient denies chest pain sob n/v. Patient denies SI/HI. Pt denies hallucinations.appears internally preoccupied poorly cooperative in triage As per ems patient was having outbreaks.   Vital Signs Last 24 Hrs  T(F): 97.8 HR: 81 BP: 131/78 RR: 18 SpO2: 100% (04 Sep 2023 12:22)   PE: as described; my additions and exceptions are noted in the chart    DATA:  EKG: pending at time of evaluation  LAB: Pending at time of evaluation    IMPRESSION/RISK:  Dx=  decompensated schizphrenia   Consideration include collateral from family psych to see will followup reccs  Plan  as above   PRNS in   will followup reccs and

## 2023-09-04 NOTE — ED PROVIDER NOTE - OBJECTIVE STATEMENT
28 yo male, residing with family in [place], unemployed, with psychiatric history schizophrenia, in current OP treatment with Dr. Chan at [facility] with 3 prior inpatient psychiatric hospitalizations the last to [facility] in June with depression no prior SA's, history of violence or legal issues PTED  BIB EMS/PD after being found pouring water in electrical outlets. Patient denies chest pain sob n/v. Patient denies SI/HI. Pt denies hallucinations.appears internally preoccupied poorly cooperative in triage As per ems patient was having outbreaks.

## 2023-09-04 NOTE — ED BEHAVIORAL HEALTH ASSESSMENT NOTE - OTHER PAST PSYCHIATRIC HISTORY (INCLUDE DETAILS REGARDING ONSET, COURSE OF ILLNESS, INPATIENT/OUTPATIENT TREATMENT)
No documented suicide attempts  3 prior admissions  Per avatar:   Pt presented to [facility] following hospitalization at [facility] in November 2018. He had been experiencing negative symptoms and disorganized behavior. He was started on Risperdal 3 mg daily with some response.  It was raised to 4 mg in February 2019, with some improvement although patient remained withdrawn. Started on Risperdal Consta 25 mg Q2 weeks in August 2020, with some improvement.  Risperdal Consta changed to Invega Sustenna 117 Q4 weeks. Patient was more appropriate, still with negative symptoms. Patient then came off of medication and traveled to [place], where he was hospitalized.  He returned to the  and was hospitalized at [facility] in June 2023 with catatonia/depression.  Patient was restarted on Invega Sustenna.  He started Prozac but promptly stopped it due to dry eye.

## 2023-09-04 NOTE — ED ADULT NURSE NOTE - NSFALLUNIVINTERV_ED_ALL_ED
Bed/Stretcher in lowest position, wheels locked, appropriate side rails in place/Call bell, personal items and telephone in reach/Instruct patient to call for assistance before getting out of bed/chair/stretcher/Non-slip footwear applied when patient is off stretcher/[place] to call system/Physically safe environment - no spills, clutter or unnecessary equipment/Purposeful proactive rounding/Room/bathroom lighting operational, light cord in reach

## 2023-09-04 NOTE — ED BEHAVIORAL HEALTH NOTE - BEHAVIORAL HEALTH NOTE
COVID Exposure Screen- collateral (i.e. third-party, chart review, belongings, etc; include EMS and ED staff) - as per Ms Diane Headley (aunt) + chart review    Has the patient been tested for COVID-19 in the last 90 days?  ( X ) Yes   (  ) No   (  ) Unknown- Reason: _____  IF YES: Date of test(s), type of test(s), result(s) for ALL tests in last 90 days: ________ negative last 6/16/2023    In the past 10 days, has the patient been around anyone with a positive COVID-19 test? (  ) Yes   ( X ) No   (  ) Unknown- Reason: ____  IF YES: Was the patient closer than 6 feet of them for a total of 15 minutes or more in a 24 hour period? (  ) Yes   (  ) No   (  ) Unknown- Reason: _____

## 2023-09-04 NOTE — ED BEHAVIORAL HEALTH ASSESSMENT NOTE - RISK ASSESSMENT
Risk factors include: male, recent inpatient hospitalizations, unable to care for basic needs, active psychosis symptoms, insomnia, academic decline.  Chronic risk factors for suicide include: h/o prior psychiatric admissions, diagnosis of schizophrenia  Protective factors include: Young, healthy, denies SI/I/P, no history of suicide attempts, no history of NSSIB, supportive family, residential stability  At this time, patient is at acute risk of harm to self due to inability to care for self not mitigated by protective factors, and therefore meets criteria for involuntary psychiatric hospitalization. Risk factors include: male, recent inpatient hospitalizations, unable to care for basic needs, active psychosis symptoms, insomnia, academic decline.  Chronic risk factors for suicide include: h/o prior psychiatric admissions, diagnosis of schizophrenia  Protective factors include: Young, healthy, denies active SI, no history of suicide attempts, no history of NSSIB, supportive family, residential stability  At this time, patient is at acute risk of harm to self due to inability to care for self not mitigated by protective factors, and therefore meets criteria for involuntary psychiatric hospitalization.

## 2023-09-04 NOTE — ED PROVIDER NOTE - PROGRESS NOTE DETAILS
DD ED ATTM put water in electrical outlet.  H/o schizophrenia, recent adm to [facility].  Boarding for schizophrenia.  Declined risperdal Po but resting comfortably at this time, no issues. Patient signed out to me pending placement at [facility].  There were no acute events overnight.  The patient rested comfortably.  We will continue to monitor until patient is placed at [facility]

## 2023-09-04 NOTE — ED BEHAVIORAL HEALTH ASSESSMENT NOTE - OTHER
denies AVH but appears RIS at times, smiling oddly to self brought in by JUSTIN and ROCKY per triage note boarding unable to fully assess due to ongoing psychosis

## 2023-09-04 NOTE — ED PROVIDER NOTE - NS ED MD DISPO DIVISION
---------------------  From: Tara Lau CMA ([facility] Message Pool ([id number]_[facility] [place]))   To: Dafne Victoria;     Sent: 2020 9:52:51 AM CST  Subject: ? re: hearing aids     PCP:   JOE      Time of Call:  9:17am       Person Calling:  Pt  Phone number:  [phone]    Returned call at: 9:50am    Note:   Pt called stating he saw Dr. Victoria yesterday to have his hearing aid repaired. He was told that his warranty has  and he was wondering if there was an extended warranty option. I told him I was not sure about this but I would send a msg to Dr. Victoria and advised she would call him back when she is available. He was fine with this.     Last office visit and reason:  _   [facility]
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
## 2023-09-05 DIAGNOSIS — F20.5 RESIDUAL SCHIZOPHRENIA: ICD-10-CM

## 2023-09-05 LAB
ANION GAP SERPL CALC-SCNC: 11 MMOL/L — SIGNIFICANT CHANGE UP (ref 7–14)
BUN SERPL-MCNC: 11 MG/DL — SIGNIFICANT CHANGE UP (ref 7–23)
CALCIUM SERPL-MCNC: 9.2 MG/DL — SIGNIFICANT CHANGE UP (ref 8.4–10.5)
CHLORIDE SERPL-SCNC: 105 MMOL/L — SIGNIFICANT CHANGE UP (ref 98–107)
CO2 SERPL-SCNC: 25 MMOL/L — SIGNIFICANT CHANGE UP (ref 22–31)
CREAT SERPL-MCNC: 0.98 MG/DL — SIGNIFICANT CHANGE UP (ref 0.5–1.3)
EGFR: 107 ML/MIN/1.73M2 — SIGNIFICANT CHANGE UP
GLUCOSE SERPL-MCNC: 86 MG/DL — SIGNIFICANT CHANGE UP (ref 70–99)
PHOSPHATE SERPL-MCNC: 3.2 MG/DL — SIGNIFICANT CHANGE UP (ref 2.5–4.5)
POTASSIUM SERPL-MCNC: 4.2 MMOL/L — SIGNIFICANT CHANGE UP (ref 3.5–5.3)
POTASSIUM SERPL-SCNC: 4.2 MMOL/L — SIGNIFICANT CHANGE UP (ref 3.5–5.3)
SODIUM SERPL-SCNC: 141 MMOL/L — SIGNIFICANT CHANGE UP (ref 135–145)

## 2023-09-05 PROCEDURE — 99211 OFF/OP EST MAY X REQ PHY/QHP: CPT | Mod: GC

## 2023-09-05 RX ORDER — DIPHENHYDRAMINE HCL 50 MG
50 CAPSULE ORAL EVERY 6 HOURS
Refills: 0 | Status: DISCONTINUED | OUTPATIENT
Start: 2023-09-05 | End: 2023-09-28

## 2023-09-05 RX ORDER — HALOPERIDOL DECANOATE 100 MG/ML
5 INJECTION INTRAMUSCULAR ONCE
Refills: 0 | Status: DISCONTINUED | OUTPATIENT
Start: 2023-09-05 | End: 2023-09-28

## 2023-09-05 RX ORDER — DIPHENHYDRAMINE HCL 50 MG
50 CAPSULE ORAL EVERY 6 HOURS
Refills: 0 | Status: DISCONTINUED | OUTPATIENT
Start: 2023-09-05 | End: 2023-09-05

## 2023-09-05 RX ORDER — RISPERIDONE 4 MG/1
2 TABLET ORAL AT BEDTIME
Refills: 0 | Status: DISCONTINUED | OUTPATIENT
Start: 2023-09-05 | End: 2023-09-06

## 2023-09-05 RX ORDER — DIPHENHYDRAMINE HCL 50 MG
50 CAPSULE ORAL ONCE
Refills: 0 | Status: DISCONTINUED | OUTPATIENT
Start: 2023-09-05 | End: 2023-09-28

## 2023-09-05 RX ORDER — HALOPERIDOL DECANOATE 100 MG/ML
5 INJECTION INTRAMUSCULAR EVERY 6 HOURS
Refills: 0 | Status: DISCONTINUED | OUTPATIENT
Start: 2023-09-05 | End: 2023-09-05

## 2023-09-05 RX ORDER — HALOPERIDOL DECANOATE 100 MG/ML
5 INJECTION INTRAMUSCULAR EVERY 6 HOURS
Refills: 0 | Status: DISCONTINUED | OUTPATIENT
Start: 2023-09-05 | End: 2023-09-28

## 2023-09-05 RX ORDER — SODIUM,POTASSIUM PHOSPHATES 278-250MG
1 POWDER IN PACKET (EA) ORAL ONCE
Refills: 0 | Status: COMPLETED | OUTPATIENT
Start: 2023-09-05 | End: 2023-09-05

## 2023-09-05 RX ADMIN — Medication 1 TABLET(S): at 10:08

## 2023-09-05 NOTE — BH INPATIENT PSYCHIATRY ASSESSMENT NOTE - RISK ASSESSMENT
Patient is at low acute suicide risk. He currently denies active SI, plan or intent. Does express having passive SI. No prior SA or NSSIB. He denies any access to lethal methods. He is at elevated chronic suicide risk due to hx of primary psychotic disorder. He is at low potential violence risk, no documented aggression towards others, has hx of property destruction.

## 2023-09-05 NOTE — ED BEHAVIORAL HEALTH PROGRESS NOTE - RISK ASSESSMENT
Risk factors include: male, recent inpatient hospitalizations, unable to care for basic needs, active psychosis symptoms, insomnia, academic decline.  Chronic risk factors for suicide include: h/o prior psychiatric admissions, diagnosis of schizophrenia  Protective factors include: Young, healthy, denies active SI, no history of suicide attempts, no history of NSSIB, supportive family, residential stability  At this time, patient is at acute risk of harm to self due to inability to care for self not mitigated by protective factors, and therefore meets criteria for involuntary psychiatric hospitalization.
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
## 2023-09-05 NOTE — BH INPATIENT PSYCHIATRY ASSESSMENT NOTE - NSBHCHARTREVIEWVS_PSY_A_CORE FT
Vital Signs Last 24 Hrs  T(C): 36.8 (09-05-23 @ 12:07), Max: 37.5 (09-05-23 @ 00:19)  T(F): 98.3 (09-05-23 @ 12:07), Max: 99.5 (09-05-23 @ 00:19)  HR: 100 (09-05-23 @ 12:07) (71 - 100)  BP: 117/79 (09-05-23 @ 12:07) (112/65 - 117/79)  BP(mean): --  RR: 17 (09-05-23 @ 12:07) (16 - 17)  SpO2: 98% (09-05-23 @ 12:07) (98% - 100%)     Vital Signs Last 24 Hrs  T(C): 36.6 (09-07-23 @ 15:58), Max: 36.7 (09-06-23 @ 19:38)  T(F): 97.8 (09-07-23 @ 15:58), Max: 98.1 (09-06-23 @ 19:38)  HR: 91 (09-07-23 @ 15:58) (91 - 91)  BP: 119/84 (09-07-23 @ 15:58) (119/84 - 119/84)  BP(mean): --  RR: 14 (09-07-23 @ 15:58) (14 - 14)  SpO2: 98% (09-07-23 @ 15:58) (98% - 98%)    Orthostatic VS  09-07-23 @ 08:46  Lying BP: --/-- HR: --  Sitting BP: 123/72 HR: 88  Standing BP: --/-- HR: --  Site: --  Mode: --  Orthostatic VS  09-06-23 @ 19:38  Lying BP: --/-- HR: --  Sitting BP: 125/90 HR: 91  Standing BP: 123/80 HR: 117  Site: --  Mode: --  Orthostatic VS  09-06-23 @ 10:26  Lying BP: --/-- HR: --  Sitting BP: 103/60 HR: --  Standing BP: 90/66 HR: --  Site: --  Mode: auscultated w/ stethoscope  Orthostatic VS  09-05-23 @ 19:22  Lying BP: --/-- HR: --  Sitting BP: 126/82 HR: 91  Standing BP: 109/83 HR: 112  Site: --  Mode: --   Vital Signs Last 24 Hrs  T(C): 36.6 (09-08-23 @ 07:33), Max: 36.6 (09-08-23 @ 07:33)  T(F): 97.9 (09-08-23 @ 07:33), Max: 97.9 (09-08-23 @ 07:33)  HR: 96 (09-08-23 @ 07:33) (96 - 96)  BP: 126/82 (09-08-23 @ 07:33) (126/82 - 126/82)  BP(mean): --  RR: 18 (09-07-23 @ 19:00) (18 - 18)  SpO2: 100% (09-07-23 @ 19:00) (100% - 100%)    Orthostatic VS  09-07-23 @ 19:00  Lying BP: --/-- HR: --  Sitting BP: 129/84 HR: 101  Standing BP: 122/81 HR: 90  Site: --  Mode: --  Orthostatic VS  09-07-23 @ 08:46  Lying BP: --/-- HR: --  Sitting BP: 123/72 HR: 88  Standing BP: --/-- HR: --  Site: --  Mode: --  Orthostatic VS  09-06-23 @ 19:38  Lying BP: --/-- HR: --  Sitting BP: 125/90 HR: 91  Standing BP: 123/80 HR: 117  Site: --  Mode: --
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
## 2023-09-05 NOTE — BH INPATIENT PSYCHIATRY ASSESSMENT NOTE - CURRENT MEDICATION
MEDICATIONS  (STANDING):  risperiDONE   Tablet 2 milliGRAM(s) Oral at bedtime    MEDICATIONS  (PRN):  diphenhydrAMINE 50 milliGRAM(s) Oral every 6 hours PRN agitation  diphenhydrAMINE Injectable 50 milliGRAM(s) IntraMuscular once PRN agitation  haloperidol     Tablet 5 milliGRAM(s) Oral every 6 hours PRN agitation  haloperidol    Injectable 5 milliGRAM(s) IntraMuscular once PRN agitation  LORazepam     Tablet 2 milliGRAM(s) Oral every 6 hours PRN Agitation  LORazepam   Injectable 2 milliGRAM(s) IntraMuscular once PRN agitation   MEDICATIONS  (STANDING):  paliperidone ER 3 milliGRAM(s) Oral at bedtime    MEDICATIONS  (PRN):  diphenhydrAMINE 50 milliGRAM(s) Oral every 6 hours PRN agitation  diphenhydrAMINE Injectable 50 milliGRAM(s) IntraMuscular once PRN agitation  haloperidol     Tablet 5 milliGRAM(s) Oral every 6 hours PRN agitation  haloperidol    Injectable 5 milliGRAM(s) IntraMuscular once PRN agitation  LORazepam     Tablet 2 milliGRAM(s) Oral every 6 hours PRN Agitation  LORazepam   Injectable 2 milliGRAM(s) IntraMuscular once PRN agitation   MEDICATIONS  (STANDING):  paliperidone ER 6 milliGRAM(s) Oral at bedtime    MEDICATIONS  (PRN):  acetaminophen     Tablet .. 650 milliGRAM(s) Oral every 6 hours PRN Mild Pain (1 - 3), Moderate Pain (4 - 6)  diphenhydrAMINE 50 milliGRAM(s) Oral every 6 hours PRN agitation  diphenhydrAMINE Injectable 50 milliGRAM(s) IntraMuscular once PRN agitation  haloperidol     Tablet 5 milliGRAM(s) Oral every 6 hours PRN agitation  haloperidol    Injectable 5 milliGRAM(s) IntraMuscular once PRN agitation  LORazepam     Tablet 2 milliGRAM(s) Oral every 6 hours PRN Agitation  LORazepam   Injectable 2 milliGRAM(s) IntraMuscular once PRN agitation

## 2023-09-05 NOTE — ED BEHAVIORAL HEALTH PROGRESS NOTE - CASE SUMMARY/FORMULATION (CLEARLY DOCUMENT RATIONALE FOR DISPOSITION CHANGE)
Patient is a 29 y/o M, single, non-caregiver, lives at home with aunt and aunt's family, PPH schizophrenia, 3 prior inpatient psychiatric hospitalizations (2018 [facility], 2023 [place], last at [facility] in June 2023), followed by Dr. Chan at [facility] for outpatient care (no showed August appt), taking Invega sustenna, no history of SA or violence, no known history of substance use, who presents to the [facility] ED on 9/4/2023 brought in by ROCKY and JUSTIN after he was discovered pouring water on electrical outlets at his home. Please see  note for collateral from pt's aunt. Briefly, family has observed patient to be more irritable, withdrawn, and easily agitated. They are concerned about psychiatric decompensation and advocating for admission.     On evaluation patient appears severely guarded and paranoid, fearful. Interview strained. Patient discloses paranoid delusions about family, is fearful that his food is being poisoned. Has passive SI with no plan or intent, and thoughts of hurting family which he will not elaborate on. Patient is unable to care for himself and requires involuntary inpatient psychiatric hospitalization for stabilization and safety.    9/5: Continues to endorse paranoid delusions towards family, but not paranoid towards staff or towards food/water provided here; however, refused risperidone yesterday night.    Plan:  1.	Legals: admit on 9.27  2.	Safety: routine observation, denies active SI. PRNs: Ativan/Haldol/Benadryl PO/IM  3.	Psychiatry: Start Risperdal 2mg qhs (to Augment invega 117mg q4w)  4.	Group, milieu, individual therapy as appropriate.  5.	Medical: Follow repeat BMP 9/5/2023 for hypokalemia s/p repletion  6.	Dispo: pending clinical improvement.  Patient continues to require inpatient hospitalization for stabilization and safety.

## 2023-09-05 NOTE — BH INPATIENT PSYCHIATRY ASSESSMENT NOTE - NSBHMETABOLIC_PSY_ALL_CORE_FT
BMI: BMI (kg/m2): 21.8 (09-05-23 @ 12:07)  HbA1c:   Glucose:   BP: 117/79 (09-05-23 @ 12:07) (112/65 - 131/78)  Lipid Panel:  BMI: BMI (kg/m2): 21.8 (09-05-23 @ 12:07)  HbA1c: A1C with Estimated Average Glucose Result: 5.5 % (09-06-23 @ 10:00)    Glucose:   BP: 119/84 (09-07-23 @ 15:58) (112/65 - 119/84)  Lipid Panel: Date/Time: 09-06-23 @ 10:00  Cholesterol, Serum: 129  Direct LDL: --  HDL Cholesterol, Serum: 56  Total Cholesterol/HDL Ration Measurement: --  Triglycerides, Serum: 76   BMI: BMI (kg/m2): 21.8 (09-05-23 @ 12:07)  HbA1c: A1C with Estimated Average Glucose Result: 5.5 % (09-06-23 @ 10:00)    Glucose:   BP: 126/82 (09-08-23 @ 07:33) (119/84 - 126/82)  Lipid Panel: Date/Time: 09-06-23 @ 10:00  Cholesterol, Serum: 129  Direct LDL: --  HDL Cholesterol, Serum: 56  Total Cholesterol/HDL Ration Measurement: --  Triglycerides, Serum: 76

## 2023-09-05 NOTE — ED BEHAVIORAL HEALTH PROGRESS NOTE - DETAILS
Pending bed assignment Diane Headley - [phone] - Aunt - Updated family on patient progress and treatment plan. Provided psychoeducation. Family in agreement. Diane Headley - [phone] - Aunjimena

## 2023-09-05 NOTE — BH INPATIENT PSYCHIATRY ASSESSMENT NOTE - OTHER PAST PSYCHIATRIC HISTORY (INCLUDE DETAILS REGARDING ONSET, COURSE OF ILLNESS, INPATIENT/OUTPATIENT TREATMENT)
No documented suicide attempts  3 prior admissions  Per avatar:   Pt presented to [facility] following hospitalization at [facility] in November 2018. He had been experiencing negative symptoms and disorganized behavior. He was started on Risperdal 3 mg daily with some response.  It was raised to 4 mg in February 2019, with some improvement although patient remained withdrawn. Started on Risperdal Consta 25 mg Q2 weeks in August 2020, with some improvement.  Risperdal Consta changed to Invega Sustenna 117 Q4 weeks. Patient was more appropriate, still with negative symptoms. Patient then came off of medication and traveled to [place], where he was hospitalized.  He returned to the  and was hospitalized at [facility] in June 2023 with catatonia/depression.  Patient was restarted on Invega Sustenna.  He started Prozac but promptly stopped it due to dry eye.

## 2023-09-05 NOTE — ED BEHAVIORAL HEALTH PROGRESS NOTE - PSYCHIATRIC ISSUES AND PLAN (INCLUDE STANDING AND PRN MEDICATION)
Start Risperdal 2mg qhs (to Augment invega 117mg q4w) - offer risperidone 2 mg again today Start Risperdal 2mg qhs (to Augment invega 117mg q4w) - patient refused, discussed alternative medication options once in inpatient leve of care Start Risperdal 2mg qhs (to Augment invega 117mg q4w) - patient refused, discussed alternative medication options once in inpatient level of care

## 2023-09-05 NOTE — BH PATIENT PROFILE - FALL HARM RISK - PATIENT NEEDS ASSISTANCE
Attempted to submit PA for Denavir 1% cream, via CMM. Patients insurance card states patient has [employer], attempted to submit pa to [employer] received the following error message: Cannot find matching patient with Name and Date of Birth provided. For additional information, please contact the phone number on the back of the member prescription ID card. Patients pharmacy has the same information can someone from [address] [place] [address] reach out to patient to verify insurance information? Please advise. Thank you. No assistance needed

## 2023-09-05 NOTE — ED BEHAVIORAL HEALTH PROGRESS NOTE - DETAILS:
Patient interviewed, noted to be resting comfortably in bed; endorses continuing paranoid delusions towards family ("a little"), but feels safe in the hospital; eating and drinking in the hospital, noted to have empty bottles of water and box of sandwich by bedside. Denies auditory hallucination, visual hallucination, suicidal ideation, violent ideation. Patient interviewed, noted to be resting comfortably in bed; reports good sleep overnight. No acute events overnight.  Patient refused medications overnight and reports that he prefers Invega     endorses continuing paranoid delusions towards family ("a little"), but feels safe in the hospital; eating and drinking in the hospital, noted to have empty bottles of water and box of sandwich by bedside. Denies auditory hallucination, visual hallucination, suicidal ideation, violent ideation. Patient interviewed, noted to be resting comfortably in bed; reports good sleep overnight. No acute events overnight.  Patient refused medications overnight and reports that he prefers Invega to Risperidone. Patient endorses continuing paranoid delusions towards family ("a little"), but feels safe in the hospital; eating and drinking in the hospital, noted to have empty bottles of water and box of sandwich by bedside. Denies auditory hallucination, visual hallucination, suicidal ideation, violent ideation.  Remainder of interview is limited due to patient appearing guarded and internally preoccupied with limited answers provided.

## 2023-09-05 NOTE — ED ADULT NURSE REASSESSMENT NOTE - NS ED NURSE REASSESS COMMENT FT1
Pt tolerates PO well, no n/v/d, abd pain endorsed.
Pt resting comfortably in bed, vitals as charted, respirations are even and unlabored. Pt endorsing no complaints at this time. Pt calm and cooperative. Awaiting bed assignment
Vitals performed by PCA.  RN at bedside.  Will continue to monitor.

## 2023-09-05 NOTE — BH PATIENT PROFILE - FALL HARM RISK - UNIVERSAL INTERVENTIONS
Bed in lowest position, wheels locked, appropriate side rails in place/Call bell, personal items and telephone in reach/Instruct patient to call for assistance before getting out of bed or chair/Non-slip footwear when patient is out of bed/[place] to call system/Physically safe environment - no spills, clutter or unnecessary equipment/Purposeful Proactive Rounding/Room/bathroom lighting operational, light cord in reach

## 2023-09-05 NOTE — ED BEHAVIORAL HEALTH PROGRESS NOTE - NSBHMSEKNOW_PSY_A_CORE
unable to fully assess due to ongoing psychosis/Normal unable to fully assess due to ongoing psychosis/Unable to assess

## 2023-09-05 NOTE — BH INPATIENT PSYCHIATRY ASSESSMENT NOTE - NSBHASSESSSUMMFT_PSY_ALL_CORE
Patient is a 29 y/o male, single, non-caregiver, lives at home with aunt and family, PPH schizophrenia, 3 prior inpatient psychiatric hospitalizations (2018 [facility], 2023 [place], last at [facility] in June 2023), followed by [facility]. Chan [facility] for outpatient care (no showed August appt), taking Invega Sustenna, no history of SA or violence, no known history of substance use, who presents to the [facility] ED on 9/4/2023 brought in by ROCKY and JUSTIN after he was discovered pouring water on electrical outlets in his home.  Patient is a 29 y/o male, single, non-caregiver, lives at home with aunt and family, PPH schizophrenia, 3 prior inpatient psychiatric hospitalizations (2018 [facility], 2023 [place], last at [facility] in June 2023), followed by Dr. Chan at [facility] for outpatient care (no showed August appt), taking Invega Sustenna, no history of SA or violence, no known history of substance use, who presents to the [facility] ED on 9/4/2023 brought in by ROCKY and JUSTIN after he was discovered pouring water on electrical outlets in his home.       Working Diagnoses:  Schizophrenia      Plan:  >Legal: 9.27  >Obs: Routine observation    >Psychiatric Meds:  Continue Risperidone 2mg, HS for psychosis    PRN medications:  Haldol 5 mg PO/IM, Q6H PRN for psychotic agitation  Lorazepam 2mg IM, PRN for severe agitation  Benadryl 50mg PO/IM, Q6H PRN for EPS ppx.    >Labs: Admission labs reviewed, no acute findings. Hold antipsychotics if QTc >500  >Medical: No acute concerns. No indication for CIWA. Patient with stable VS, no visible physical symptoms of withdrawal. During the course of treatment, will collaborate with medical team to manage medical issues.  >Diet: Regular  >Social: Milieu/structured therapy  >Treatment Interventions: Groups and Individual Therapy/CBT.  >Dispo: Collateral and dispo planning pending further symptom and medication optimization Patient is a 29 y/o male, single, non-caregiver, lives at home with aunt and family, PPH schizophrenia, 3 prior inpatient psychiatric hospitalizations (2018 [facility], 2023 [place], last at [facility] in June 2023), [facility] for outpatient care (no showed August appt), taking Invega Sustenna, no history of SA or violence, no known history of substance use, who presents to the [facility] ED on 9/4/2023 brought in by ROCKY and JUSTIN after he was discovered pouring water on electrical outlets in his home.       Working Diagnoses:  Schizophrenia  R/o schizoaffective disorder      Plan:  >Legal: 9.27  >Obs: Routine observation    >Psychiatric Meds:  Continue Risperidone 2mg, HS for psychosis    PRN medications:  Haldol 5 mg PO/IM, Q6H PRN for psychotic agitation  Lorazepam 2mg IM, PRN for severe agitation  Benadryl 50mg PO/IM, Q6H PRN for EPS ppx.    >Labs: Admission labs reviewed, no acute findings. Hold antipsychotics if QTc >500  >Medical: No acute concerns. No indication for CIWA. Patient with stable VS, no visible physical symptoms of withdrawal. During the course of treatment, will collaborate with medical team to manage medical issues.  >Diet: Regular  >Social: Milieu/structured therapy  >Treatment Interventions: Groups and Individual Therapy/CBT.  >Dispo: Collateral and dispo planning pending further symptom and medication optimization

## 2023-09-05 NOTE — ED BEHAVIORAL HEALTH PROGRESS NOTE - OTHER
limited; no sunday disorganization unable to fully assess due to ongoing psychosis limited; no sunday loosening Pending bed assignment

## 2023-09-06 LAB
A1C WITH ESTIMATED AVERAGE GLUCOSE RESULT: 5.5 % — SIGNIFICANT CHANGE UP (ref 4–5.6)
CHOLEST SERPL-MCNC: 129 MG/DL — SIGNIFICANT CHANGE UP
ESTIMATED AVERAGE GLUCOSE: 111 — SIGNIFICANT CHANGE UP
FOLATE SERPL-MCNC: 13.8 NG/ML — SIGNIFICANT CHANGE UP (ref 3.1–17.5)
HDLC SERPL-MCNC: 56 MG/DL — SIGNIFICANT CHANGE UP
LIPID PNL WITH DIRECT LDL SERPL: 58 MG/DL — SIGNIFICANT CHANGE UP
MAGNESIUM SERPL-MCNC: 1.9 MG/DL — SIGNIFICANT CHANGE UP (ref 1.6–2.6)
NON HDL CHOLESTEROL: 73 MG/DL — SIGNIFICANT CHANGE UP
TRIGL SERPL-MCNC: 76 MG/DL — SIGNIFICANT CHANGE UP
TSH SERPL-MCNC: 2.78 UIU/ML — SIGNIFICANT CHANGE UP (ref 0.27–4.2)
VIT B12 SERPL-MCNC: 452 PG/ML — SIGNIFICANT CHANGE UP (ref 200–900)

## 2023-09-06 PROCEDURE — 99232 SBSQ HOSP IP/OBS MODERATE 35: CPT

## 2023-09-06 RX ORDER — PALIPERIDONE 1.5 MG/1
3 TABLET, EXTENDED RELEASE ORAL AT BEDTIME
Refills: 0 | Status: DISCONTINUED | OUTPATIENT
Start: 2023-09-06 | End: 2023-09-08

## 2023-09-06 RX ADMIN — PALIPERIDONE 3 MILLIGRAM(S): 1.5 TABLET, EXTENDED RELEASE ORAL at 21:06

## 2023-09-06 NOTE — PSYCHIATRIC REHAB INITIAL EVALUATION - NSBHPRRECOMMEND_PSY_ALL_CORE
Pt is a 27 y/o male. Pt has hx of 3 prior psychiatric admissions and most recently at [facility] in June 2023.  Pt stated he is currently in treatment with [facility]  and attended [facility]. Pt was admitted to [place] [address] due to medication non-compliance, aggression and psychotic. Pt stated he has been compliant with medication prior to admission. As per chart, pt missed his August appointment and his last seen by Dr. Chan was on 5/28/2023. Pt admitted he was pouring water on electrical outlet at home. Chart indicated, pt’s family noticed pt has been behaving  bizarrely including pouring water in outlets, mopping it up, breaking things at home. Pt has minimized all symptoms. Chart indicated, pt is paranoid and has fear that his family is poisoning his food and his family making him talk to stranger. As per chart, pt has HI to hurt his family.  Chart indicated, pt has been depressed, anxious, easy irritable/agitated, and has passive SI. Pt denies hx of substance use.    During this assessment, pt is guarded, internally preoccupied, responding to internal stimuli, disorganized, paranoid, and only respond one to three words answer.      Writer met with pt, introduced self and clinical team. Writer has oriented psychiatric rehabilitation services and function. Pt was provided with a copy of welcome letter and unit schedule.

## 2023-09-06 NOTE — BH SOCIAL WORK INITIAL PSYCHOSOCIAL EVALUATION - OTHER PAST PSYCHIATRIC HISTORY (INCLUDE DETAILS REGARDING ONSET, COURSE OF ILLNESS, INPATIENT/OUTPATIENT TREATMENT)
According to  Assessment conducted on 9/4/23, "Patient is a 27 y/o M, single, non-caregiver, lives at home with aunt and aunt's family, PPH schizophrenia, 3 prior inpatient psychiatric hospitalizations (2018 [facility], 2023 [place], last at [facility] in June 2023), followed by Dr. Chan at \[facility]"" for outpatient care (no showed August appt), taking Invega sustenna, no history of SA or violence, no known history of substance use, who presents to the [facility] ED on 9/4/2023 brought in by ROCKY and JUSTIN after he was discovered pouring water on electrical outlets in his home. Please see  note for collateral from pt's aunt. Briefly, family has observed patient to be withdrawn, easily agitated/irritated, and behaving bizarrely (pouring water in outlets, mopping it up, breaking things around house). They are concerned about psychiatric decompensation and advocating for admission.

## 2023-09-06 NOTE — BH INPATIENT PSYCHIATRY PROGRESS NOTE - NSBHFUPINTERVALHXFT_PSY_A_CORE
Patient is followed up for depression/psychosis. Chart, medications and labs reviewed. Patient is discussed during morning brief, no events reported overnight, in good behavioral control.  Patient was seen and is evaluated on the unit. He avoid eye contact when spoken to and is laconic in speech. He reports having good sleep and appetite yesterday. States that he did not want to take Risperdal overnight as it causes him to have "weak legs". Writer discussed switching to Paliperidone ER and he is agreeable to taking this. He denies AVH, although appears internally preoccupied. He expresses paranoid thoughts regarding his family. No acute medical concerns, VSS. He was able to have labs drawn today AM. Continue to monitor and provide therapeutic support. Patient is followed up for depression/psychosis. Chart, medications and labs reviewed. Patient is discussed during morning brief, no events reported overnight, in good behavioral control.  Patient was seen and is evaluated on the unit. He avoids eye contact when spoken to and is laconic in speech. He reports having good sleep and appetite yesterday. States that he did not want to take Risperdal overnight as it causes him to have "weak legs". Writer discussed switching to Paliperidone ER and he is agreeable to taking this. He denies AVH, although appears internally preoccupied. He expresses paranoid thoughts regarding his family. No acute medical concerns, VSS. He was able to have labs drawn today AM. Continue to monitor and provide therapeutic support.

## 2023-09-06 NOTE — BH INPATIENT PSYCHIATRY PROGRESS NOTE - NSBHCHARTREVIEWVS_PSY_A_CORE FT
Vital Signs Last 24 Hrs  T(C): 36.6 (09-05-23 @ 19:22), Max: 36.6 (09-05-23 @ 19:22)  T(F): 97.8 (09-05-23 @ 19:22), Max: 97.8 (09-05-23 @ 19:22)  HR: --  BP: --  BP(mean): --  RR: 16 (09-06-23 @ 10:26) (16 - 16)  SpO2: --    Orthostatic VS  09-06-23 @ 10:26  Lying BP: --/-- HR: --  Sitting BP: 103/60 HR: --  Standing BP: 90/66 HR: --  Site: --  Mode: auscultated w/ stethoscope  Orthostatic VS  09-05-23 @ 19:22  Lying BP: --/-- HR: --  Sitting BP: 126/82 HR: 91  Standing BP: 109/83 HR: 112  Site: --  Mode: --   Vital Signs Last 24 Hrs  T(C): 36.7 (09-06-23 @ 19:38), Max: 36.7 (09-06-23 @ 19:38)  T(F): 98.1 (09-06-23 @ 19:38), Max: 98.1 (09-06-23 @ 19:38)  HR: --  BP: --  BP(mean): --  RR: 16 (09-06-23 @ 10:26) (16 - 16)  SpO2: --    Orthostatic VS  09-06-23 @ 19:38  Lying BP: --/-- HR: --  Sitting BP: 125/90 HR: 91  Standing BP: 123/80 HR: 117  Site: --  Mode: --  Orthostatic VS  09-06-23 @ 10:26  Lying BP: --/-- HR: --  Sitting BP: 103/60 HR: --  Standing BP: 90/66 HR: --  Site: --  Mode: auscultated w/ stethoscope  Orthostatic VS  09-05-23 @ 19:22  Lying BP: --/-- HR: --  Sitting BP: 126/82 HR: 91  Standing BP: 109/83 HR: 112  Site: --  Mode: --

## 2023-09-06 NOTE — BH INPATIENT PSYCHIATRY PROGRESS NOTE - NSBHASSESSSUMMFT_PSY_ALL_CORE
Patient is a 27 y/o male, single, non-caregiver, lives at home with aunt and family, PPH schizophrenia, 3 prior inpatient psychiatric hospitalizations (2018 [facility], 2023 [place], last at [facility] in June 2023), [facility] (no showed August appt), taking Invega Sustenna, no history of SA or violence, no known history of substance use, who presents to the [facility] ED on 9/4/2023 brought in by ROCKY and JUSTIN after he was discovered pouring water on electrical outlets in his home.       Working Diagnoses:  Schizophrenia      Plan:  >Legal: 9.27  >Obs: Routine observation    >Psychiatric Meds:  Discontinue Risperidone 2mg, HS for psychosis  Start Paliperidone ER 3mg, HS for psychosis    PRN medications:  Haldol 5 mg PO/IM, Q6H PRN for psychotic agitation  Lorazepam 2mg IM, PRN for severe agitation  Benadryl 50mg PO/IM, Q6H PRN for EPS ppx.    >Labs: Admission labs reviewed, no acute findings. Hold antipsychotics if QTc >500  >Medical: No acute concerns. No indication for CIWA. Patient with stable VS, no visible physical symptoms of withdrawal. During the course of treatment, will collaborate with medical team to manage medical issues.  >Diet: Regular  >Social: Milieu/structured therapy  >Treatment Interventions: Groups and Individual Therapy/CBT.  >Dispo: Collateral and dispo planning pending further symptom and medication optimization

## 2023-09-06 NOTE — BH INPATIENT PSYCHIATRY PROGRESS NOTE - CURRENT MEDICATION
MEDICATIONS  (STANDING):  paliperidone ER 3 milliGRAM(s) Oral at bedtime    MEDICATIONS  (PRN):  diphenhydrAMINE 50 milliGRAM(s) Oral every 6 hours PRN agitation  diphenhydrAMINE Injectable 50 milliGRAM(s) IntraMuscular once PRN agitation  haloperidol     Tablet 5 milliGRAM(s) Oral every 6 hours PRN agitation  haloperidol    Injectable 5 milliGRAM(s) IntraMuscular once PRN agitation  LORazepam     Tablet 2 milliGRAM(s) Oral every 6 hours PRN Agitation  LORazepam   Injectable 2 milliGRAM(s) IntraMuscular once PRN agitation

## 2023-09-06 NOTE — BH SOCIAL WORK INITIAL PSYCHOSOCIAL EVALUATION - NSBHHOUSECOMMENTFT_PSY_ALL_CORE
Pt reports that he feels comfortable returning home, although would prefer to have his own apartment.

## 2023-09-06 NOTE — BH INPATIENT PSYCHIATRY PROGRESS NOTE - NSBHMETABOLIC_PSY_ALL_CORE_FT
BMI: BMI (kg/m2): 21.8 (09-05-23 @ 12:07)  HbA1c: A1C with Estimated Average Glucose Result: 5.5 % (09-06-23 @ 10:00)    Glucose:   BP: 117/79 (09-05-23 @ 12:07) (112/65 - 131/78)  Lipid Panel: Date/Time: 09-06-23 @ 10:00  Cholesterol, Serum: 129  Direct LDL: --  HDL Cholesterol, Serum: 56  Total Cholesterol/HDL Ration Measurement: --  Triglycerides, Serum: 76

## 2023-09-07 LAB

## 2023-09-07 PROCEDURE — 99232 SBSQ HOSP IP/OBS MODERATE 35: CPT

## 2023-09-07 RX ORDER — DIPHENHYDRAMINE HCL 50 MG
50 CAPSULE ORAL ONCE
Refills: 0 | Status: COMPLETED | OUTPATIENT
Start: 2023-09-07 | End: 2023-09-07

## 2023-09-07 RX ORDER — HALOPERIDOL DECANOATE 100 MG/ML
5 INJECTION INTRAMUSCULAR ONCE
Refills: 0 | Status: COMPLETED | OUTPATIENT
Start: 2023-09-07 | End: 2023-09-07

## 2023-09-07 RX ADMIN — PALIPERIDONE 3 MILLIGRAM(S): 1.5 TABLET, EXTENDED RELEASE ORAL at 20:19

## 2023-09-07 RX ADMIN — Medication 50 MILLIGRAM(S): at 00:42

## 2023-09-07 RX ADMIN — Medication 2 MILLIGRAM(S): at 00:42

## 2023-09-07 RX ADMIN — HALOPERIDOL DECANOATE 5 MILLIGRAM(S): 100 INJECTION INTRAMUSCULAR at 00:42

## 2023-09-07 NOTE — BH INPATIENT PSYCHIATRY PROGRESS NOTE - NSBHMETABOLIC_PSY_ALL_CORE_FT
BMI: BMI (kg/m2): 21.8 (09-05-23 @ 12:07)  HbA1c: A1C with Estimated Average Glucose Result: 5.5 % (09-06-23 @ 10:00)    Glucose:   BP: 119/84 (09-07-23 @ 15:58) (112/65 - 119/84)  Lipid Panel: Date/Time: 09-06-23 @ 10:00  Cholesterol, Serum: 129  Direct LDL: --  HDL Cholesterol, Serum: 56  Total Cholesterol/HDL Ration Measurement: --  Triglycerides, Serum: 76

## 2023-09-07 NOTE — BH CHART NOTE - NSEVENTNOTEFT_PSY_ALL_CORE
PAUL called for activation of IM medications due to acute patient agitation. Psych emergency called at 12:29. Per staff, patient woke up from sleep and started screaming unintelligably. Roommate was also sleeping at that time. Patient appeared to be responding to internal stimuli. He did not respond to redirection from staff and refused PO PRNs. Patient damaged the door to his room by slamming it shut. Haldol 5 mg IM, Ativan 2 mg IM, and Benadryl 50 mg IM were administered at 12:34 with fair effect due to behavior representing danger to others. Patient seen resting in his bed afterwards with sheets over his head. He denied any injuries or pain. Advised RN staff to notify PAUL if patient continues to be agitated. Work order placed for broken door. PAUL called for activation of IM medications due to acute patient agitation. Psych emergency called at 00:29. Per staff, patient woke up from sleep and started screaming. Roommate was also sleeping at that time. Patient appeared to be responding to internal stimuli. He did not respond to redirection from staff and refused PO PRNs. Patient damaged the door to his room by slamming it shut. Haldol 5 mg IM, Ativan 2 mg IM, and Benadryl 50 mg IM were administered at 12:34 with fair effect due to behavior representing danger to others. Patient seen resting in his bed afterwards with sheets over his head. He denied any injuries or pain. Advised RN staff to notify PAUL if patient continues to be agitated. Work order placed for broken door. PAUL called for activation of IM medications due to acute patient agitation. Psych emergency called at 00:29. Per staff, patient woke up from sleep and started screaming. Roommate was also sleeping at that time. Patient appeared to be responding to internal stimuli. He did not respond to redirection from staff and refused PO PRNs. Patient damaged the door to his room by repeatedly slamming it shut. Haldol 5 mg IM, Ativan 2 mg IM, and Benadryl 50 mg IM were administered at 00:34 with fair effect due to behavior representing danger to others. Patient seen resting in his bed afterwards with sheets over his head. He denied any injuries or pain. Advised RN staff to notify PAUL if patient continues to be agitated. Work order placed for broken door.

## 2023-09-07 NOTE — BH INPATIENT PSYCHIATRY PROGRESS NOTE - NSBHASSESSSUMMFT_PSY_ALL_CORE
Patient is a 29 y/o male, single, non-caregiver, lives at home with aunt and family, PPH schizophrenia, 3 prior inpatient psychiatric hospitalizations (2018 [facility], 2023 [place], last at [facility] in June 2023), followed by Dr. Chan at [facility] for outpatient care (no showed August appt), taking Invega Sustenna, no history of SA or violence, no known history of substance use, who presents to the [facility] ED on 9/4/2023 brought in by ROCKY and JUSTIN after he was discovered pouring water on electrical outlets in his home.       9/7: On assessment, patient remains internally stimulated, paranoid, and appears depressed. He received IM's overnight for agitation after he repeatedly slammed bedroom door. Ordered RVP for URI symptoms and pt tested COVID+, to be transferred to .    Working Diagnoses:  Schizophrenia      Plan:  >Legal: 9.27  >Obs: Routine observation    >Psychiatric Meds:  Continue Paliperidone ER 3mg, HS for psychosis    PRN medications:  Haldol 5 mg PO/IM, Q6H PRN for psychotic agitation  Lorazepam 2mg IM, PRN for severe agitation  Benadryl 50mg PO/IM, Q6H PRN for EPS ppx.    >Labs: Admission labs reviewed, no acute findings. Hold antipsychotics if QTc >500  >Medical: No acute concerns. No indication for CIWA. Patient with stable VS, no visible physical symptoms of withdrawal. During the course of treatment, will collaborate with medical team to manage medical issues.  >Diet: Regular  >Social: Milieu/structured therapy  >Treatment Interventions: Groups and Individual Therapy/CBT.  >Dispo: Collateral and dispo planning pending further symptom and medication optimization

## 2023-09-07 NOTE — BH INPATIENT PSYCHIATRY PROGRESS NOTE - NSBHFUPINTERVALHXFT_PSY_A_CORE
Patient is followed up for depression/psychosis. Chart, medications and labs reviewed. Patient is discussed during morning brief, received IM's overnight for acute agitation, patient repeatedly slammed bedroom door.  Patient was seen and is evaluated at bedside. He avoids eye contact and looks down, laconic in speech. When asked about events overnight he initially does not want to discuss and shakes his head. He does eventually verbalize having a "bad dream", states that someone was trying to kill him in this dream. He was able to accept standing medications, denies SE. He denies AVH, although appears internally preoccupied. He continues to express paranoid thoughts regarding his family, denies safety concerns on unit. He endorses symptoms of URI, ordered RVP and patient tested positive for COVID, VSS. Continue to monitor and provide therapeutic support.

## 2023-09-07 NOTE — BH INPATIENT PSYCHIATRY PROGRESS NOTE - NSBHCHARTREVIEWVS_PSY_A_CORE FT
Vital Signs Last 24 Hrs  T(C): 36.6 (09-07-23 @ 15:58), Max: 36.7 (09-06-23 @ 19:38)  T(F): 97.8 (09-07-23 @ 15:58), Max: 98.1 (09-06-23 @ 19:38)  HR: 91 (09-07-23 @ 15:58) (91 - 91)  BP: 119/84 (09-07-23 @ 15:58) (119/84 - 119/84)  BP(mean): --  RR: 14 (09-07-23 @ 15:58) (14 - 14)  SpO2: 98% (09-07-23 @ 15:58) (98% - 98%)    Orthostatic VS  09-07-23 @ 08:46  Lying BP: --/-- HR: --  Sitting BP: 123/72 HR: 88  Standing BP: --/-- HR: --  Site: --  Mode: --  Orthostatic VS  09-06-23 @ 19:38  Lying BP: --/-- HR: --  Sitting BP: 125/90 HR: 91  Standing BP: 123/80 HR: 117  Site: --  Mode: --  Orthostatic VS  09-06-23 @ 10:26  Lying BP: --/-- HR: --  Sitting BP: 103/60 HR: --  Standing BP: 90/66 HR: --  Site: --  Mode: auscultated w/ stethoscope  Orthostatic VS  09-05-23 @ 19:22  Lying BP: --/-- HR: --  Sitting BP: 126/82 HR: 91  Standing BP: 109/83 HR: 112  Site: --  Mode: --

## 2023-09-08 RX ORDER — ACETAMINOPHEN 500 MG
650 TABLET ORAL EVERY 6 HOURS
Refills: 0 | Status: DISCONTINUED | OUTPATIENT
Start: 2023-09-08 | End: 2023-09-28

## 2023-09-08 RX ORDER — PALIPERIDONE 1.5 MG/1
6 TABLET, EXTENDED RELEASE ORAL AT BEDTIME
Refills: 0 | Status: DISCONTINUED | OUTPATIENT
Start: 2023-09-08 | End: 2023-09-11

## 2023-09-08 RX ADMIN — PALIPERIDONE 6 MILLIGRAM(S): 1.5 TABLET, EXTENDED RELEASE ORAL at 20:57

## 2023-09-08 NOTE — BH INPATIENT PSYCHIATRY PROGRESS NOTE - NSBHFUPINTERVALHXFT_PSY_A_CORE
On evaluation this morning, patient is lying in bed with blankets covering his head. He is arousable to his name; when asked what brings him to this unit, he replies, "COVID." When pressed further about what precipitated his hospital admission, he states that he has been experiencing "family problems" and "arguments." He adds that he has been arguing with his aunt, who has been "blocking his pipes" and targeting him. Patient also states that he lives with his aunt, brother, and sister; when asked whether he is concerned about the safety of his brother and sister who are living with the same aunt, he says no.    Patient confirms that he follows up with Dr. Alan Chan outpatient and last followed-up with him last month. Patient also reports that he has been compliant with his Invega Sustenna injections.    He denies any SI, HI, or hallucinations at this time.

## 2023-09-08 NOTE — BH INPATIENT PSYCHIATRY PROGRESS NOTE - NSBHMSEKNOW_PSY_A_CORE
unable to fully assess due to ongoing psychosis/Normal

## 2023-09-08 NOTE — BH INPATIENT PSYCHIATRY PROGRESS NOTE - NSBHMSERECMEM_PSY_A_CORE
impaired due to ongoing psychosis/Normal

## 2023-09-08 NOTE — BH INPATIENT PSYCHIATRY PROGRESS NOTE - CURRENT MEDICATION
MEDICATIONS  (STANDING):  paliperidone ER 3 milliGRAM(s) Oral at bedtime    MEDICATIONS  (PRN):  diphenhydrAMINE 50 milliGRAM(s) Oral every 6 hours PRN agitation  diphenhydrAMINE Injectable 50 milliGRAM(s) IntraMuscular once PRN agitation  haloperidol     Tablet 5 milliGRAM(s) Oral every 6 hours PRN agitation  haloperidol    Injectable 5 milliGRAM(s) IntraMuscular once PRN agitation  LORazepam     Tablet 2 milliGRAM(s) Oral every 6 hours PRN Agitation  LORazepam   Injectable 2 milliGRAM(s) IntraMuscular once PRN agitation   MEDICATIONS  (STANDING):    MEDICATIONS  (PRN):  diphenhydrAMINE 50 milliGRAM(s) Oral every 6 hours PRN agitation  diphenhydrAMINE Injectable 50 milliGRAM(s) IntraMuscular once PRN agitation  haloperidol     Tablet 5 milliGRAM(s) Oral every 6 hours PRN agitation  haloperidol    Injectable 5 milliGRAM(s) IntraMuscular once PRN agitation  LORazepam     Tablet 2 milliGRAM(s) Oral every 6 hours PRN Agitation  LORazepam   Injectable 2 milliGRAM(s) IntraMuscular once PRN agitation

## 2023-09-08 NOTE — BH INPATIENT PSYCHIATRY PROGRESS NOTE - GENERAL APPEARANCE
gaunt, dry, appeared as stated age/No deformities present

## 2023-09-08 NOTE — BH INPATIENT PSYCHIATRY PROGRESS NOTE - NSBHCHARTREVIEWVS_PSY_A_CORE FT
Vital Signs Last 24 Hrs  T(C): 36.6 (09-08-23 @ 07:33), Max: 36.6 (09-07-23 @ 15:58)  T(F): 97.9 (09-08-23 @ 07:33), Max: 97.9 (09-08-23 @ 07:33)  HR: 96 (09-08-23 @ 07:33) (91 - 96)  BP: 126/82 (09-08-23 @ 07:33) (119/84 - 126/82)  BP(mean): --  RR: 18 (09-07-23 @ 19:00) (14 - 18)  SpO2: 100% (09-07-23 @ 19:00) (98% - 100%)    Orthostatic VS  09-07-23 @ 19:00  Lying BP: --/-- HR: --  Sitting BP: 129/84 HR: 101  Standing BP: 122/81 HR: 90  Site: --  Mode: --  Orthostatic VS  09-07-23 @ 08:46  Lying BP: --/-- HR: --  Sitting BP: 123/72 HR: 88  Standing BP: --/-- HR: --  Site: --  Mode: --  Orthostatic VS  09-06-23 @ 19:38  Lying BP: --/-- HR: --  Sitting BP: 125/90 HR: 91  Standing BP: 123/80 HR: 117  Site: --  Mode: --

## 2023-09-08 NOTE — BH INPATIENT PSYCHIATRY PROGRESS NOTE - NSBHMSEEYE_PSY_A_CORE
Patient unable to make any eye contact/Poor

## 2023-09-08 NOTE — BH INPATIENT PSYCHIATRY PROGRESS NOTE - NSBHASSESSSUMMFT_PSY_ALL_CORE
Patient is a 29 y/o male, single, non-caregiver, lives at home with aunt and family, PPH schizophrenia, 3 prior inpatient psychiatric hospitalizations (2018 [facility], 2023 [place], last at [facility] in June 2023), followed by Dr. Alan Chan at [facility] for outpatient care (no showed August appt), taking Invega Sustenna, no history of SA or violence, no known history of substance use, who presents to the [facility] ED on 9/4/2023 brought in by ROCKY and JUSTIN after he was discovered pouring water on electrical outlets in his home.     On assessment, patient appears depressed with paranoid delusions and flat affect. According to previous records and CVM, he was last administered Invega Sustenna 117mg IM q4 weekly on 08/21/23. Will reach out to Dr. Chan for more information and recommendations regarding care.    Working Diagnoses:  Schizophrenia    Plan:  >Legal: 9.27  >Obs: Routine observation    >Psychiatric Meds:  Continue Paliperidone ER 3mg, HS for psychosis    PRN medications:  Haldol 5 mg PO/IM, Q6H PRN for psychotic agitation  Lorazepam 2mg IM, PRN for severe agitation  Benadryl 50mg PO/IM, Q6H PRN for EPS ppx.    >Labs: Admission labs reviewed, no acute findings. Hold antipsychotics if QTc >500  >Medical: No acute concerns. No indication for CIWA. Patient with stable VS, no visible physical symptoms of withdrawal. During the course of treatment, will collaborate with medical team to manage medical issues.  >Diet: Regular  >Social: Milieu/structured therapy  >Treatment Interventions: Groups and Individual Therapy/CBT.  >Dispo: Collateral and dispo planning pending further symptom and medication optimization Patient is a 27 y/o male, single, non-caregiver, lives at home with aunt and family, PPH schizophrenia, 3 prior inpatient psychiatric hospitalizations (2018 [facility], 2023 [place], last at [facility] in June 2023), followed by Dr. Alan Chan at \[facility] for outpatient care (no showed August appt), taking Invega Sustenna, no history of SA or violence, no known history of substance use, who presents to the [facility] ED on 9/4/2023 brought in by ROCKY and JUSTIN after he was discovered pouring water on electrical outlets in his home.     On assessment, patient appears depressed with paranoid delusions and flat affect. According to previous records and CVM, he was last administered Invega Sustenna 117mg IM q4 weekly on 08/21/23. Will reach out to Dr. Chan for more information and recommendations regarding care.    Working Diagnoses:  Schizophrenia    PLAN:  #Safety: q15 obs    #Psychopharm:  -  Continue Paliperidone ER 3mg, HS for psychosis.   -  PRNs: Haldol 5 mg PO/IM, Q6H PRN for psychotic agitation, Lorazepam 2mg IM, PRN for severe agitation, Benadryl 50mg PO/IM, Q6H PRN for EPS ppx.  -  Individual, group, milieu therapy.  -  Psychoeducation provided to patient regarding indication for medications, alternatives, side effects, adherence.    #Medical:  -  No acute concerns. No indication for CIWA. Patient with stable VS, no visible physical symptoms of withdrawal. During the course of treatment, will collaborate with medical team to manage medical issues.  -  Routine labs  -  Hold antipsychotics if QTc >500.    #Legal: 9.27    #Disposition: Collateral and disposition planning pending further symptom and medication optimization.  Patient is a 29 y/o male, single, non-caregiver, lives at home with aunt and family, PPH schizophrenia, 3 prior inpatient psychiatric hospitalizations (2018 [facility], 2023 [place], last at [facility] in June 2023), followed by Dr. Alan Chan at [facility] for outpatient care (no showed August appt), taking Invega Sustenna, no history of SA or violence, no known history of substance use, who presents to the [facility] ED on 9/4/2023 brought in by ROCKY and JUSTIN after he was discovered pouring water on electrical outlets in his home.     On assessment, patient appears depressed with paranoid beliefs and flat affect. According to previous records and CVM, he was last administered Invega Sustenna 117mg IM q4 weekly on 08/21/23. Will reach out to Dr. Chan for more information and recommendations regarding care. patient continues to exhibit symptoms of psychosis warranting ongoing hospitalization for symptom stabilization with medication optimization, and to coordinate aftercare services. plan as below     Working Diagnoses:  Schizophrenia    PLAN:  #Safety: q15 obs    #Psychopharm:  -  INCREASE Invega Sustenna to 6mg qhs   -  PRNs: Haldol 5 mg PO/IM, Q6H PRN for psychotic agitation, Lorazepam 2mg IM, PRN for severe agitation, Benadryl 50mg PO/IM, Q6H PRN for EPS ppx.  -  Individual, group, milieu therapy.  -  Psychoeducation provided to patient regarding indication for medications, alternatives, side effects, adherence.    #Medical:  -  No acute concerns. tylenol prn for pain   -  Routine labs  -  Hold antipsychotics if QTc >500.    #Legal: 9.27    #Disposition: Collateral and disposition planning pending further symptom and medication optimization.

## 2023-09-08 NOTE — BH INPATIENT PSYCHIATRY PROGRESS NOTE - NSBHMETABOLIC_PSY_ALL_CORE_FT
BMI: BMI (kg/m2): 21.8 (09-05-23 @ 12:07)  HbA1c: A1C with Estimated Average Glucose Result: 5.5 % (09-06-23 @ 10:00)    Glucose:   BP: 126/82 (09-08-23 @ 07:33) (117/79 - 126/82)  Lipid Panel: Date/Time: 09-06-23 @ 10:00  Cholesterol, Serum: 129  Direct LDL: --  HDL Cholesterol, Serum: 56  Total Cholesterol/HDL Ration Measurement: --  Triglycerides, Serum: 76   BMI: BMI (kg/m2): 21.8 (09-05-23 @ 12:07)  HbA1c: A1C with Estimated Average Glucose Result: 5.5 % (09-06-23 @ 10:00)    Glucose:   BP: 126/82 (09-08-23 @ 07:33) (119/84 - 126/82)  Lipid Panel: Date/Time: 09-06-23 @ 10:00  Cholesterol, Serum: 129  Direct LDL: --  HDL Cholesterol, Serum: 56  Total Cholesterol/HDL Ration Measurement: --  Triglycerides, Serum: 76

## 2023-09-09 PROCEDURE — 99231 SBSQ HOSP IP/OBS SF/LOW 25: CPT

## 2023-09-09 RX ADMIN — PALIPERIDONE 6 MILLIGRAM(S): 1.5 TABLET, EXTENDED RELEASE ORAL at 21:00

## 2023-09-09 NOTE — BH INPATIENT PSYCHIATRY PROGRESS NOTE - CURRENT MEDICATION
MEDICATIONS  (STANDING):  paliperidone ER 6 milliGRAM(s) Oral at bedtime    MEDICATIONS  (PRN):  acetaminophen     Tablet .. 650 milliGRAM(s) Oral every 6 hours PRN Mild Pain (1 - 3), Moderate Pain (4 - 6)  diphenhydrAMINE 50 milliGRAM(s) Oral every 6 hours PRN agitation  diphenhydrAMINE Injectable 50 milliGRAM(s) IntraMuscular once PRN agitation  haloperidol     Tablet 5 milliGRAM(s) Oral every 6 hours PRN agitation  haloperidol    Injectable 5 milliGRAM(s) IntraMuscular once PRN agitation  LORazepam     Tablet 2 milliGRAM(s) Oral every 6 hours PRN Agitation  LORazepam   Injectable 2 milliGRAM(s) IntraMuscular once PRN agitation

## 2023-09-09 NOTE — BH INPATIENT PSYCHIATRY PROGRESS NOTE - NSBHASSESSSUMMFT_PSY_ALL_CORE
Patient is a 27 y/o male, single, non-caregiver, lives at home with aunt and family, PPH schizophrenia, 3 prior inpatient psychiatric hospitalizations (2018 [facility], 2023 [place], last at [facility] in June 2023), followed by Dr. Alan Chan at [facility] for outpatient care (no showed August appt), taking Invega Sustenna, no history of SA or violence, no known history of substance use, who presents to the [facility] ED on 9/4/2023 brought in by ROCKY and JUSTIN after he was discovered pouring water on electrical outlets in his home.   On assessment, patient appears depressed with paranoid beliefs and flat affect. According to previous records and CVM, he was last administered Invega Sustenna 117mg IM q4 weekly on 08/21/23. Will reach out to Dr. Chan for more information and recommendations regarding care. patient continues to exhibit symptoms of psychosis warranting ongoing hospitalization for symptom stabilization with medication optimization, and to coordinate aftercare services. plan as below     Working Diagnoses:  Schizophrenia    On assessment, patient was guarded and paranoia suspected.    PLAN:  #Safety: q15 obs    #Psychopharm:  -  INCREASE Invega Sustenna to 6mg qhs   -  PRNs: Haldol 5 mg PO/IM, Q6H PRN for psychotic agitation, Lorazepam 2mg IM, PRN for severe agitation, Benadryl 50mg PO/IM, Q6H PRN for EPS ppx.  -  Individual, group, milieu therapy.  -  Psychoeducation provided to patient regarding indication for medications, alternatives, side effects, adherence.    #Medical:  -  No acute concerns. tylenol prn for pain   -  Routine labs  -  Hold antipsychotics if QTc >500.    #Legal: 9.27    #Disposition: Collateral and disposition planning pending further symptom and medication optimization.

## 2023-09-09 NOTE — BH INPATIENT PSYCHIATRY PROGRESS NOTE - NSBHFUPINTERVALHXFT_PSY_A_CORE
Chart reviewed and case discussed with treatment team. No events reported overnight. Sleep and appetite is fair.  Patient stated that he feels "ok" and was superficially cooperative. He denies any SI/HI or AVH, paranoia suspected. Patient is compliant with medications, no adverse effects reported.

## 2023-09-09 NOTE — BH INPATIENT PSYCHIATRY PROGRESS NOTE - NSBHMETABOLIC_PSY_ALL_CORE_FT
BMI: BMI (kg/m2): 21.8 (09-05-23 @ 12:07)  HbA1c: A1C with Estimated Average Glucose Result: 5.5 % (09-06-23 @ 10:00)    Glucose:   BP: 126/82 (09-08-23 @ 07:33) (119/84 - 126/82)  Lipid Panel: Date/Time: 09-06-23 @ 10:00  Cholesterol, Serum: 129  Direct LDL: --  HDL Cholesterol, Serum: 56  Total Cholesterol/HDL Ration Measurement: --  Triglycerides, Serum: 76

## 2023-09-09 NOTE — BH INPATIENT PSYCHIATRY PROGRESS NOTE - NSBHCHARTREVIEWVS_PSY_A_CORE FT
Vital Signs Last 24 Hrs  T(C): 36.8 (09-09-23 @ 08:30), Max: 36.8 (09-09-23 @ 08:30)  T(F): 98.2 (09-09-23 @ 08:30), Max: 98.2 (09-09-23 @ 08:30)  HR: --  BP: --  BP(mean): --  RR: --  SpO2: --    Orthostatic VS  09-09-23 @ 08:30  Lying BP: --/-- HR: --  Sitting BP: 110/78 HR: 88  Standing BP: --/-- HR: --  Site: --  Mode: --  Orthostatic VS  09-07-23 @ 19:00  Lying BP: --/-- HR: --  Sitting BP: 129/84 HR: 101  Standing BP: 122/81 HR: 90  Site: --  Mode: --

## 2023-09-10 RX ADMIN — PALIPERIDONE 6 MILLIGRAM(S): 1.5 TABLET, EXTENDED RELEASE ORAL at 21:32

## 2023-09-11 PROCEDURE — 99232 SBSQ HOSP IP/OBS MODERATE 35: CPT

## 2023-09-11 RX ORDER — PALIPERIDONE 1.5 MG/1
9 TABLET, EXTENDED RELEASE ORAL AT BEDTIME
Refills: 0 | Status: DISCONTINUED | OUTPATIENT
Start: 2023-09-11 | End: 2023-09-20

## 2023-09-11 RX ADMIN — PALIPERIDONE 9 MILLIGRAM(S): 1.5 TABLET, EXTENDED RELEASE ORAL at 21:55

## 2023-09-11 NOTE — BH INPATIENT PSYCHIATRY PROGRESS NOTE - NSBHASSESSSUMMFT_PSY_ALL_CORE
Patient is a 27 y/o male, single, non-caregiver, lives at home with aunt and family, PPH schizophrenia, 3 prior inpatient psychiatric hospitalizations (2018 [facility], 2023 [place], last at [facility] in June 2023), followed by Dr. Alan Chan at \[facility] for outpatient care (no showed August appt), taking Invega Sustenna, no history of SA or violence, no known history of substance use, who presents to the [facility] ED on 9/4/2023 brought in by ROCKY and JUSTIN after he was discovered pouring water on electrical outlets in his home.     On assessment, patient appears depressed with paranoid beliefs and flat affect. According to previous records and CVM, he was last administered Invega Sustenna 117mg IM q4 weekly on 08/21/23. Dr. Chan contacted for more recommendations, who suggests increasing Invega Sustenna to 156mg. Patient continues to exhibit symptoms of psychosis, warranting ongoing hospitalization for symptom stabilization with medication optimization, and to coordinate aftercare services. Plan as below.    Working Diagnoses:  Schizophrenia    On assessment, patient was guarded and paranoia suspected.    PLAN:  #Safety: q15 obs    #Psychopharm:  -  INCREASE Invega from 6mg to 9mg qhs   -  Administer Invega Sustenna 156mg IM next Monday 09/18/23.  -  PRNs: Haldol 5 mg PO/IM, Q6H PRN for psychotic agitation, Lorazepam 2mg IM, PRN for severe agitation, Benadryl 50mg PO/IM, Q6H PRN for EPS ppx.  -  Individual, group, milieu therapy.  -  Psychoeducation provided to patient regarding indication for medications, alternatives, side effects, adherence.    #Medical:  -  No acute concerns. tylenol prn for pain   -  Routine labs  -  Hold antipsychotics if QTc >500.    #Legal: 9.27    #Disposition: Collateral and disposition planning pending further symptom and medication optimization.

## 2023-09-11 NOTE — BH INPATIENT PSYCHIATRY PROGRESS NOTE - NSBHMETABOLIC_PSY_ALL_CORE_FT
BMI: BMI (kg/m2): 21.8 (09-05-23 @ 12:07)  HbA1c: A1C with Estimated Average Glucose Result: 5.5 % (09-06-23 @ 10:00)    Glucose:   BP: --  Lipid Panel: Date/Time: 09-06-23 @ 10:00  Cholesterol, Serum: 129  Direct LDL: --  HDL Cholesterol, Serum: 56  Total Cholesterol/HDL Ration Measurement: --  Triglycerides, Serum: 76

## 2023-09-11 NOTE — BH CHART NOTE - NSEVENTNOTEFT_PSY_ALL_CORE
COLLATERAL HISTORY  Collateral history obtained from aunt Diane Headley. She reports that patient has been living with her and his grandmother for the past 13 years in [place], while his mother lives in [place] and his father lives in [place]. She reveals that patient has always exhibited certain OCD-like tendencies while growing up; for example, wearing gloves while holding plates or opening doors due to the fear of germs. She states that last summer, patient became frustrated at being told to wash his hair with shampoo and subsequently called his mother in [place] and told her that he was relocating to live with her. Patient then bought his own plane ticket, packed his belongings, and traveled to [place] in July 2022.    According to aunt, patient would stay at home alone while his mother went to work during the day. She believes that patient became increasingly isolated at that time and became “really involved” in social media, after which he “really went insane.” Per aunt, patient started not eating and collapsed on the floor, after which patient was psychiatrically hospitalized in [place] for 4-5 weeks. Patient was then discharged and subsequently [place] to the [place] in [place] 2023. Aunt states that during his stay in [place], from July 2022 to June 2023, patient was not receiving any medications and was not following up with Dr. Alan Chan in any way.    Aunt states that patient returned from [place] more assertive, stating that he was an adult and wanted to live independently. However, she also states that patient began mentally deteriorating, verbally rambling, and his OCD-liked tendencies escalated; she states that his entire bedroom is currently covered in paper towels and toilet paper, and that he had begun refusing to touch or hold anything in his hands without paper towels or toilet paper. She also states that patient began writing on the walls and mirrors and also began hoarding various clothes and amenities such as boxers, briefs, toothbrushes, hand , and bottles of water. She reports that he began to wrap articles of clothing in multiple plastic bags as well.    Aunt reports concern over the patient’s condition and wants to know whether he would like to return home. She also wants to know if the patient is angry at her or his grandmother. COLLATERAL HISTORY  Collateral history obtained from aunjimena Headley ([phone]). She reports that patient has been living with her and his grandmother for the past 13 years in [place], while his mother lives in [place] and his father lives in [place]. She reveals that patient has always exhibited certain OCD-like tendencies while growing up; for example, wearing gloves while holding plates or opening doors due to the fear of germs. She states that last summer, patient became frustrated at being told to wash his hair with shampoo and subsequently called his mother in [place] and told her that he was relocating to live with her. Patient then bought his own plane ticket, packed his belongings, and traveled to [place] in July 2022.    According to aunt, patient would stay at home alone while his mother went to work during the day. She believes that patient became increasingly isolated at that time and became “really involved” in social media, after which he “really went insane.” Per aunt, patient started not eating and collapsed on the floor, after which patient was psychiatrically hospitalized in [place] for 4-5 weeks. Patient was then discharged and subsequently [place] in [place]23. Aunt states that during his stay in [place], from July 2022 to June 2023, patient was not receiving any medications and was not following up with Dr. Alan Chan in any way.    Aunt states that patient returned from [place] more assertive, stating that he was an adult and wanted to live independently. However, she also states that patient began mentally deteriorating, verbally rambling, and his OCD-liked tendencies escalated; she states that his entire bedroom is currently covered in paper towels and toilet paper, and that he had begun refusing to touch or hold anything in his hands without paper towels or toilet paper. She also states that patient began writing on the walls and mirrors and also began hoarding various clothes and amenities such as boxers, briefs, toothbrushes, hand , and bottles of water. She reports that he began to wrap articles of clothing in multiple plastic bags as well.    Aunt reports concern over the patient’s condition and wants to know whether he would like to return home. She also wants to know if the patient is angry at her or his grandmother.

## 2023-09-11 NOTE — BH INPATIENT PSYCHIATRY PROGRESS NOTE - NSBHCHARTREVIEWVS_PSY_A_CORE FT
Vital Signs Last 24 Hrs  T(C): 37.2 (09-11-23 @ 08:47), Max: 37.2 (09-11-23 @ 08:47)  T(F): 99 (09-11-23 @ 08:47), Max: 99 (09-11-23 @ 08:47)  HR: --  BP: --  BP(mean): --  RR: 18 (09-11-23 @ 08:47) (18 - 18)  SpO2: 99% (09-11-23 @ 08:47) (99% - 99%)    Orthostatic VS  09-11-23 @ 08:47  Lying BP: --/-- HR: --  Sitting BP: 113/75 HR: 82  Standing BP: --/-- HR: --  Site: --  Mode: --  Orthostatic VS  09-10-23 @ 07:43  Lying BP: --/-- HR: --  Sitting BP: 107/74 HR: 80  Standing BP: 108/60 HR: 99  Site: --  Mode: --

## 2023-09-11 NOTE — BH INPATIENT PSYCHIATRY PROGRESS NOTE - CURRENT MEDICATION
MEDICATIONS  (STANDING):  paliperidone ER 6 milliGRAM(s) Oral at bedtime    MEDICATIONS  (PRN):  acetaminophen     Tablet .. 650 milliGRAM(s) Oral every 6 hours PRN Mild Pain (1 - 3), Moderate Pain (4 - 6)  diphenhydrAMINE 50 milliGRAM(s) Oral every 6 hours PRN agitation  diphenhydrAMINE Injectable 50 milliGRAM(s) IntraMuscular once PRN agitation  haloperidol     Tablet 5 milliGRAM(s) Oral every 6 hours PRN agitation  haloperidol    Injectable 5 milliGRAM(s) IntraMuscular once PRN agitation  LORazepam     Tablet 2 milliGRAM(s) Oral every 6 hours PRN Agitation  LORazepam   Injectable 2 milliGRAM(s) IntraMuscular once PRN agitation   MEDICATIONS  (STANDING):  paliperidone ER 9 milliGRAM(s) Oral at bedtime    MEDICATIONS  (PRN):  acetaminophen     Tablet .. 650 milliGRAM(s) Oral every 6 hours PRN Mild Pain (1 - 3), Moderate Pain (4 - 6)  diphenhydrAMINE 50 milliGRAM(s) Oral every 6 hours PRN agitation  diphenhydrAMINE Injectable 50 milliGRAM(s) IntraMuscular once PRN agitation  haloperidol     Tablet 5 milliGRAM(s) Oral every 6 hours PRN agitation  haloperidol    Injectable 5 milliGRAM(s) IntraMuscular once PRN agitation  LORazepam     Tablet 2 milliGRAM(s) Oral every 6 hours PRN Agitation  LORazepam   Injectable 2 milliGRAM(s) IntraMuscular once PRN agitation

## 2023-09-11 NOTE — BH INPATIENT PSYCHIATRY PROGRESS NOTE - NSBHFUPINTERVALHXFT_PSY_A_CORE
On evaluation this morning, patient is found lying in his bed with his blankets wrapped around his head. He states that he is feeling "alright," but complains of a "stuffy nose." When asked about his relationship with his aunt and whether he still believes that she intends to hurt him, he replies, "Not sure." Patient is noted to be quiet and withdrawn on interview. When asked about his relationship with his parents, he reveals that he traveled to [place] earlier this year to visit his mother, who lives in the country. He states that his father lives in [place], and he also adds that he was forced to return to the [place] from [place] in 2023 after his visa . Patient strongly encouraged to get out of bed and walk around the unit instead of remaining in his bed in isolation.    Patient denies any SI, HI, or hallucinations at this time.

## 2023-09-12 RX ORDER — PALIPERIDONE 1.5 MG/1
156 TABLET, EXTENDED RELEASE ORAL ONCE
Refills: 0 | Status: COMPLETED | OUTPATIENT
Start: 2023-09-18 | End: 2023-09-18

## 2023-09-12 RX ADMIN — PALIPERIDONE 9 MILLIGRAM(S): 1.5 TABLET, EXTENDED RELEASE ORAL at 20:57

## 2023-09-12 NOTE — BH INPATIENT PSYCHIATRY PROGRESS NOTE - NSBHCHARTREVIEWVS_PSY_A_CORE FT
Vital Signs Last 24 Hrs  T(C): 36.9 (09-12-23 @ 09:16), Max: 36.9 (09-12-23 @ 09:16)  T(F): 98.4 (09-12-23 @ 09:16), Max: 98.4 (09-12-23 @ 09:16)  HR: --  BP: --  BP(mean): --  RR: --  SpO2: --    Orthostatic VS  09-12-23 @ 09:16  Lying BP: --/-- HR: --  Sitting BP: 122/64 HR: 54  Standing BP: 100/57 HR: 80  Site: --  Mode: --  Orthostatic VS  09-11-23 @ 08:47  Lying BP: --/-- HR: --  Sitting BP: 113/75 HR: 82  Standing BP: --/-- HR: --  Site: --  Mode: --

## 2023-09-12 NOTE — BH INPATIENT PSYCHIATRY PROGRESS NOTE - CURRENT MEDICATION
MEDICATIONS  (STANDING):  paliperidone ER 9 milliGRAM(s) Oral at bedtime    MEDICATIONS  (PRN):  acetaminophen     Tablet .. 650 milliGRAM(s) Oral every 6 hours PRN Mild Pain (1 - 3), Moderate Pain (4 - 6)  diphenhydrAMINE 50 milliGRAM(s) Oral every 6 hours PRN agitation  diphenhydrAMINE Injectable 50 milliGRAM(s) IntraMuscular once PRN agitation  haloperidol     Tablet 5 milliGRAM(s) Oral every 6 hours PRN agitation  haloperidol    Injectable 5 milliGRAM(s) IntraMuscular once PRN agitation  LORazepam     Tablet 2 milliGRAM(s) Oral every 6 hours PRN Agitation  LORazepam   Injectable 2 milliGRAM(s) IntraMuscular once PRN agitation

## 2023-09-12 NOTE — BH INPATIENT PSYCHIATRY PROGRESS NOTE - NSBHASSESSSUMMFT_PSY_ALL_CORE
Patient is a 29 y/o male, single, non-caregiver, lives at home with aunt and family, PPH schizophrenia, 3 prior inpatient psychiatric hospitalizations (2018 [facility], 2023 [place], last at [facility] in June 2023), followed by Dr. Alan Chan at [facility] for outpatient care (no showed August appt), taking Invega Sustenna, no history of SA or violence, no known history of substance use, who presents to the [facility] ED on 9/4/2023 brought in by ROCKY and JUSTIN after he was discovered pouring water on electrical outlets in his home.     On assessment, patient appears depressed with paranoid beliefs and flat affect. According to previous records and CVM, he was last administered Invega Sustenna 117mg IM q4 weekly on 08/21/23. Dr. Chan contacted for more recommendations, who suggests increasing Invega Sustenna to 156mg. Patient continues to exhibit symptoms of psychosis, warranting ongoing hospitalization for symptom stabilization with medication optimization, and to coordinate aftercare services. Physical examination reveals low concern for catatonia at this time; patient's physical isolation and lack of activity are likely negative manifestations of schizophrenia. Plan as below.    Working Diagnoses:  Schizophrenia    On assessment, patient was guarded and paranoia suspected.    PLAN:  #Safety: q15 obs    #Psychopharm:  -  INCREASE Invega from 6mg to 9mg qhs   -  Administer Invega Sustenna 156mg IM next Monday 09/18/23.  -  PRNs: Haldol 5 mg PO/IM, Q6H PRN for psychotic agitation, Lorazepam 2mg IM, PRN for severe agitation, Benadryl 50mg PO/IM, Q6H PRN for EPS ppx.  -  Individual, group, milieu therapy.  -  Psychoeducation provided to patient regarding indication for medications, alternatives, side effects, adherence.    #Medical:  -  No acute concerns. tylenol prn for pain   -  Routine labs  -  Hold antipsychotics if QTc >500.    #Legal: 9.27    #Disposition: Collateral and disposition planning pending further symptom and medication optimization.

## 2023-09-12 NOTE — BH INPATIENT PSYCHIATRY PROGRESS NOTE - NSBHFUPINTERVALHXFT_PSY_A_CORE
On evaluation this morning, patient is found lying in bed with his sheets wrapped around him, similar to evaluation yesterday. Patient states that he was able to read a book yesterday and says that he feels "ok." Patient's responses noted to be one-word answers with underproduction of speech. When asked if he wants to go home, patient nods his head and states that if he does not go home, then he "won't get benefits." When asked about his relationship with his aunt, patient noted to be silent and states that she wants him to secure a job. Patient also questioned about his one-year stay in [place]; his responses are minimal, but he reveals that he volunteered at a [employer] [place].    Patient strongly encouraged to get out of bed and walk around the unit. Patient states that he has no interest in meeting other people, but does request a book and pencil. He states that he has a decent appetite and eats two meals a day, usually skipping breakfast. He denies any SI, HI, or hallucinations at this time.

## 2023-09-13 RX ADMIN — PALIPERIDONE 9 MILLIGRAM(S): 1.5 TABLET, EXTENDED RELEASE ORAL at 20:39

## 2023-09-13 NOTE — BH INPATIENT PSYCHIATRY PROGRESS NOTE - NSBHCHARTREVIEWVS_PSY_A_CORE FT
Vital Signs Last 24 Hrs  T(C): 36.7 (09-13-23 @ 08:05), Max: 36.7 (09-13-23 @ 08:05)  T(F): 98 (09-13-23 @ 08:05), Max: 98 (09-13-23 @ 08:05)  HR: --  BP: --  BP(mean): --  RR: --  SpO2: --    Orthostatic VS  09-13-23 @ 08:05  Lying BP: --/-- HR: --  Sitting BP: 99/69 HR: 79  Standing BP: 108/69 HR: 93  Site: --  Mode: --  Orthostatic VS  09-12-23 @ 09:16  Lying BP: --/-- HR: --  Sitting BP: 122/64 HR: 54  Standing BP: 100/57 HR: 80  Site: --  Mode: --

## 2023-09-13 NOTE — BH INPATIENT PSYCHIATRY PROGRESS NOTE - NSBHFUPINTERVALHXFT_PSY_A_CORE
On evaluation this morning, patient is found lying in his bed with his blankets wrapped around his head, similar to yesterday. Patient continues to speak in one-word answers with low speech productivity. He reports that he was able to walk around the unit yesterday, shower, and speak to his sister over the phone. When asked about what he spoke about with his sister, he replies, "I don't remember anything." When asked about his relationship with his aunt, he states that his aunt wants him to obtain a job and that this is a "good" thing, not a bad thing. Patient reports that he received a notebook and pencil yesterday but has not used them yet.    When asked if the patient needs anything or has any questions, patient replies that he wants to see a . He denies any SI, HI, or hallucinations at this time. On evaluation this morning, patient is found lying in his bed with his blankets wrapped around his head, similar to yesterday. Patient continues to speak in one-word answers with low speech productivity. He reports that he was able to walk around the unit yesterday, shower, and speak to his sister over the phone. When asked about what he spoke about with his sister, he replies, "I don't remember anything." When asked about his relationship with his aunt, he states that his aunt wants him to obtain a job and that this is a "good" thing, not a bad thing. Patient reports that he received a notebook and pencil yesterday but has not used them yet.    When asked if the patient needs anything or has any questions, patient replies that he wants to see a  to find out more about "benefits." He denies any SI, HI, or hallucinations at this time.

## 2023-09-13 NOTE — BH INPATIENT PSYCHIATRY PROGRESS NOTE - NSBHASSESSSUMMFT_PSY_ALL_CORE
Patient is a 29 y/o male, single, non-caregiver, lives at home with aunt and family, PPH schizophrenia, 3 prior inpatient psychiatric hospitalizations (2018 [facility], 2023 [place], last at [facility] in June 2023), followed by Dr. Alan Chan at [facility] for outpatient care (no showed August appt), taking Invega Sustenna, no history of SA or violence, no known history of substance use, who presents to the [facility] ED on 9/4/2023 brought in by ROCKY and JUSTIN after he was discovered pouring water on electrical outlets in his home.     On assessment, patient appears depressed with paranoid beliefs and flat affect. According to previous records and CVM, he was last administered Invega Sustenna 117mg IM q4 weekly on 08/21/23. Dr. Chan contacted for more recommendations, who suggests increasing Invega Sustenna to 156mg. Patient continues to exhibit symptoms of psychosis, warranting ongoing hospitalization for symptom stabilization with medication optimization, and to coordinate aftercare services. Physical examination reveals low concern for catatonia at this time; patient's physical isolation and lack of activity are likely negative manifestations of schizophrenia. Plan as below.    Working Diagnoses:  Schizophrenia    On assessment, patient was guarded and paranoia suspected.    PLAN:  #Safety: q15 obs    #Psychopharm:  -  INCREASE Invega from 6mg to 9mg qhs   -  Administer Invega Sustenna 156mg IM next Monday 09/18/23.  -  PRNs: Haldol 5 mg PO/IM, Q6H PRN for psychotic agitation, Lorazepam 2mg IM, PRN for severe agitation, Benadryl 50mg PO/IM, Q6H PRN for EPS ppx.  -  Individual, group, milieu therapy.  -  Psychoeducation provided to patient regarding indication for medications, alternatives, side effects, adherence.    #Medical:  -  No acute concerns. tylenol prn for pain   -  Routine labs  -  Hold antipsychotics if QTc >500.    #Legal: 9.27    #Disposition: Collateral and disposition planning pending further symptom and medication optimization.  Patient is a 29 y/o male, single, non-caregiver, lives at home with aunt and family, PPH schizophrenia, 3 prior inpatient psychiatric hospitalizations (2018 [facility], 2023 [place], last at [facility] in June 2023), followed by Dr. Alan Chan at [facility] for outpatient care (no showed August appt), taking Invega Sustenna, no history of SA or violence, no known history of substance use, who presents to the [facility] ED on 9/4/2023 brought in by ROCKY and JUSTIN after he was discovered pouring water on electrical outlets in his home.     On assessment, patient appears depressed with paranoid beliefs and flat affect. According to previous records and CVM, he was last administered Invega Sustenna 117mg IM q4 weekly on 08/21/23. Dr. Chan contacted for more recommendations, who suggests increasing Invega Sustenna to 156mg. Patient continues to exhibit symptoms of psychosis, warranting ongoing hospitalization for symptom stabilization with medication optimization, and to coordinate aftercare services. Physical examination reveals low concern for catatonia at this time; patient's physical isolation and lack of activity are likely negative manifestations of schizophrenia. Plan as below.    Working Diagnoses:  Schizophrenia    On assessment, patient was guarded and paranoia suspected. continues to exhibit negative symptoms     PLAN:  #Safety: q15 obs    #Psychopharm:  -  Continue Invega PO 9mg qhs   -  Administer Invega Sustenna 156mg IM next Monday 09/18/23.  -  PRNs: Haldol 5 mg PO/IM, Q6H PRN for psychotic agitation, Lorazepam 2mg IM, PRN for severe agitation, Benadryl 50mg PO/IM, Q6H PRN for EPS ppx.  -  Individual, group, milieu therapy.  -  Psychoeducation provided to patient regarding indication for medications, alternatives, side effects, adherence.    #Medical:  -  No acute concerns. tylenol prn for pain   -  Routine labs  -  Hold antipsychotics if QTc >500.    #Legal: 9.27    #Disposition: Collateral and disposition planning pending further symptom and medication optimization.

## 2023-09-14 LAB — SARS-COV-2 RNA SPEC QL NAA+PROBE: DETECTED

## 2023-09-14 PROCEDURE — 99232 SBSQ HOSP IP/OBS MODERATE 35: CPT

## 2023-09-14 RX ADMIN — PALIPERIDONE 9 MILLIGRAM(S): 1.5 TABLET, EXTENDED RELEASE ORAL at 20:50

## 2023-09-14 NOTE — BH INPATIENT PSYCHIATRY PROGRESS NOTE - NSBHMETABOLIC_PSY_ALL_CORE_FT
BMI: BMI (kg/m2): 21.8 (09-05-23 @ 12:07)  HbA1c: A1C with Estimated Average Glucose Result: 5.5 % (09-06-23 @ 10:00)    Glucose:   BP: 108/68 (09-14-23 @ 08:28) (108/68 - 108/68)  Lipid Panel: Date/Time: 09-06-23 @ 10:00  Cholesterol, Serum: 129  Direct LDL: --  HDL Cholesterol, Serum: 56  Total Cholesterol/HDL Ration Measurement: --  Triglycerides, Serum: 76

## 2023-09-14 NOTE — BH INPATIENT PSYCHIATRY PROGRESS NOTE - NSBHFUPINTERVALHXFT_PSY_A_CORE
On evaluation this morning, patient is lying in his bed with his sheets wrapped around his head, similar to yesterday. Patient is arousable to his voice and reports that he had a decent sleep. Patient continues to mainly respond with one-word answers and low speech productivity. He denies any complaints and states that he will eat breakfast soon. When asked about his aunt, he replies that his feelings towards her are the "same." When asked if he has any questions, the patient states that he would like to shave his beard and trim his fingernails.     Patient denies any SI, HI, or hallucinations at this time. On evaluation this morning, patient is lying in his bed with his sheets wrapped around his head, similar to yesterday. Patient is arousable to his voice and reports that he had a decent sleep. Patient continues to mainly respond with one-word answers and low speech productivity. He denies any complaints and states that he will eat breakfast soon. When asked about his aunt, he replies that his feelings towards her are the "same." When asked if he has any questions, the patient states that he would like to shave his beard and trim his fingernails. Patient strongly encouraged to get out of bed and walk around the unit.    Patient denies any SI, HI, or hallucinations at this time.

## 2023-09-14 NOTE — BH INPATIENT PSYCHIATRY PROGRESS NOTE - NSBHCHARTREVIEWVS_PSY_A_CORE FT
Vital Signs Last 24 Hrs  T(C): 36.7 (09-14-23 @ 08:28), Max: 36.7 (09-14-23 @ 08:28)  T(F): 98.1 (09-14-23 @ 08:28), Max: 98.1 (09-14-23 @ 08:28)  HR: 67 (09-14-23 @ 08:28) (67 - 67)  BP: 108/68 (09-14-23 @ 08:28) (108/68 - 108/68)  BP(mean): --  RR: 16 (09-14-23 @ 08:28) (16 - 16)  SpO2: --    Orthostatic VS  09-13-23 @ 08:05  Lying BP: --/-- HR: --  Sitting BP: 99/69 HR: 79  Standing BP: 108/69 HR: 93  Site: --  Mode: --  Orthostatic VS  09-12-23 @ 09:16  Lying BP: --/-- HR: --  Sitting BP: 122/64 HR: 54  Standing BP: 100/57 HR: 80  Site: --  Mode: --   Vital Signs Last 24 Hrs  T(C): 36.7 (09-14-23 @ 08:28), Max: 36.7 (09-14-23 @ 08:28)  T(F): 98.1 (09-14-23 @ 08:28), Max: 98.1 (09-14-23 @ 08:28)  HR: 67 (09-14-23 @ 08:28) (67 - 67)  BP: 108/68 (09-14-23 @ 08:28) (108/68 - 108/68)  BP(mean): --  RR: 16 (09-14-23 @ 08:28) (16 - 16)  SpO2: --    Orthostatic VS  09-13-23 @ 08:05  Lying BP: --/-- HR: --  Sitting BP: 99/69 HR: 79  Standing BP: 108/69 HR: 93  Site: --  Mode: --

## 2023-09-14 NOTE — BH INPATIENT PSYCHIATRY PROGRESS NOTE - NSBHASSESSSUMMFT_PSY_ALL_CORE
Patient is a 29 y/o male, single, non-caregiver, lives at home with aunt and family, PPH schizophrenia, 3 prior inpatient psychiatric hospitalizations (2018 [facility], 2023 [place], last at [facility] in June 2023), followed by Dr. Alan Chan at [facility] for outpatient care (no showed August appt), taking Invega Sustenna, no history of SA or violence, no known history of substance use, who presents to the [facility] ED on 9/4/2023 brought in by ROCKY and JUSTIN after he was discovered pouring water on electrical outlets in his home.     On assessment, patient appears depressed with paranoid beliefs and flat affect. According to previous records and CVM, he was last administered Invega Sustenna 117mg IM q4 weekly on 08/21/23. Dr. Chan contacted for more recommendations, who suggests increasing Invega Sustenna to 156mg. Patient continues to exhibit symptoms of psychosis, warranting ongoing hospitalization for symptom stabilization with medication optimization, and to coordinate aftercare services. Physical examination reveals low concern for catatonia at this time; patient's physical isolation and lack of activity are likely negative manifestations of schizophrenia. Plan as below.    Working Diagnoses:  Schizophrenia    On assessment, patient was guarded and paranoia suspected. Continues to exhibit negative symptoms.    PLAN:  #Safety: q15 obs    #Psychopharm:  -  Continue Invega PO 9mg qhs   -  Administer Invega Sustenna 156mg IM next Monday 09/18/23.  -  PRNs: Haldol 5 mg PO/IM, Q6H PRN for psychotic agitation, Lorazepam 2mg IM, PRN for severe agitation, Benadryl 50mg PO/IM, Q6H PRN for EPS ppx.  -  Individual, group, milieu therapy.  -  Psychoeducation provided to patient regarding indication for medications, alternatives, side effects, adherence.    #Medical:  -  No acute concerns. tylenol prn for pain   -  Routine labs  -  Hold antipsychotics if QTc >500.    #Legal: 9.27    #Disposition: Collateral and disposition planning pending further symptom and medication optimization.  Patient is a 29 y/o male, single, non-caregiver, lives at home with aunt and family, PPH schizophrenia, 3 prior inpatient psychiatric hospitalizations (2018 [facility], 2023 [place], last at [facility] in June 2023), followed by Dr. Alan Chan at [facility] for outpatient care (no showed August appt), taking Invega Sustenna, no history of SA or violence, no known history of substance use, who presents to the [facility] ED on 9/4/2023 brought in by ROCKY and JUSTIN after he was discovered pouring water on electrical outlets in his home.     On assessment, patient appears depressed with paranoid beliefs and flat affect. According to previous records and CVM, he was last administered Invega Sustenna 117mg IM q4 weekly on 08/21/23. Dr. Chan contacted for more recommendations, who suggests increasing Invega Sustenna to 156mg. Patient continues to exhibit symptoms of psychosis, warranting ongoing hospitalization for symptom stabilization with medication optimization, and to coordinate aftercare services. Physical examination reveals low concern for catatonia at this time; patient's physical isolation and lack of activity are likely negative manifestations of schizophrenia. Patient will be tested for COVID later today. Plan as below.    Working Diagnoses:  Schizophrenia    On assessment, patient was guarded and paranoia suspected. Continues to exhibit negative symptoms.    PLAN:  #Safety: q15 obs    #Psychopharm:  -  Continue Invega PO 9mg qhs   -  Administer Invega Sustenna 156mg IM next Monday 09/18/23.  -  PRNs: Haldol 5 mg PO/IM, Q6H PRN for psychotic agitation, Lorazepam 2mg IM, PRN for severe agitation, Benadryl 50mg PO/IM, Q6H PRN for EPS ppx.  -  Individual, group, milieu therapy.  -  Psychoeducation provided to patient regarding indication for medications, alternatives, side effects, adherence.    #Medical:  -  No acute concerns. tylenol prn for pain   -  Routine labs  -  Hold antipsychotics if QTc >500.    #Legal: 9.27    #Disposition: Collateral and disposition planning pending further symptom and medication optimization.

## 2023-09-15 RX ORDER — ESCITALOPRAM OXALATE 10 MG/1
5 TABLET, FILM COATED ORAL DAILY
Refills: 0 | Status: DISCONTINUED | OUTPATIENT
Start: 2023-09-16 | End: 2023-09-18

## 2023-09-15 RX ADMIN — PALIPERIDONE 9 MILLIGRAM(S): 1.5 TABLET, EXTENDED RELEASE ORAL at 21:30

## 2023-09-15 NOTE — BH SCALES AND SCREENS - NSHAMDWORKINTER_PSY_ALL_CORE
4 = Stopped working because of present illness. Doesn’t shave, bathe, etc. Avoids diop activites; works only with urging

## 2023-09-15 NOTE — BH INPATIENT PSYCHIATRY PROGRESS NOTE - NSBHASSESSSUMMFT_PSY_ALL_CORE
Patient is a 29 y/o male, single, non-caregiver, lives at home with aunt and family, PPH schizophrenia, 3 prior inpatient psychiatric hospitalizations (2018 [facility], 2023 [place], last at [facility] in June 2023), followed by Dr. Alan Chan at [facility] for outpatient care (no showed August appt), taking Invega Sustenna, no history of SA or violence, no known history of substance use, who presents to the [facility] ED on 9/4/2023 brought in by ROCKY and JUSTIN after he was discovered pouring water on electrical outlets in his home.     On assessment, patient appears depressed with paranoid beliefs and flat affect. According to previous records and CVM, he was last administered Invega Sustenna 117mg IM q4 weekly on 08/21/23. Dr. Chan contacted for more recommendations, who suggests increasing Invega Sustenna to 156mg. Patient continues to exhibit symptoms of psychosis, warranting ongoing hospitalization for symptom stabilization with medication optimization, and to coordinate aftercare services. Physical examination reveals low concern for catatonia at this time; patient's physical isolation and lack of activity are likely negative manifestations of schizophrenia. Patient's COVID test was positive yesterday 09/14/23; will thus remain in quarantine until the end of 09/17/23. Plan as below.    Working Diagnoses:  Schizophrenia    On assessment, patient was guarded and paranoia suspected. Continues to exhibit negative symptoms.    PLAN:  #Safety: q15 obs    #Psychopharm:  -  Continue Invega PO 9mg qhs   -  Administer Invega Sustenna 156mg IM next Monday 09/18/23.  -  PRNs: Haldol 5 mg PO/IM, Q6H PRN for psychotic agitation, Lorazepam 2mg IM, PRN for severe agitation, Benadryl 50mg PO/IM, Q6H PRN for EPS ppx.  -  Individual, group, milieu therapy.  -  Psychoeducation provided to patient regarding indication for medications, alternatives, side effects, adherence.    #Medical:  -  Continue COVID quarantine until end of 09/17/23.  -  No acute concerns. tylenol prn for pain   -  Routine labs  -  Hold antipsychotics if QTc >500.    #Legal: 9.27    #Disposition: Collateral and disposition planning pending further symptom and medication optimization.  Patient is a 29 y/o male, single, non-caregiver, lives at home with aunt and family, PPH schizophrenia, 3 prior inpatient psychiatric hospitalizations (2018 [facility], 2023 [place], last at [facility] in June 2023), followed by Dr. Alan Chan at [facility] for outpatient care (no showed August appt), taking Invega Sustenna, no history of SA or violence, no known history of substance use, who presents to the [facility] ED on 9/4/2023 brought in by ROCKY and JUSTIN after he was discovered pouring water on electrical outlets in his home.     On assessment, patient appears depressed with paranoid beliefs and flat affect. According to previous records and CVM, he was last administered Invega Sustenna 117mg IM q4 weekly on 08/21/23. Dr. Chan contacted for more recommendations, who suggests increasing Invega Sustenna to 156mg. Patient continues to exhibit symptoms of psychosis, warranting ongoing hospitalization for symptom stabilization with medication optimization, and to coordinate aftercare services. Physical examination reveals low concern for catatonia at this time; patient's physical isolation and lack of activity are likely negative manifestations of schizophrenia. Patient's COVID test was positive yesterday 09/14/23; will thus remain in quarantine until the end of 09/18/23. Plan as below.    Working Diagnoses:  Schizophrenia    On assessment, patient was guarded and paranoia suspected. Continues to exhibit negative symptoms.    PLAN:  #Safety: q15 obs    #Psychopharm:  -  Continue Invega PO 9mg qhs   -  Administer Invega Sustenna 156mg IM next Monday 09/18/23.  -  PRNs: Haldol 5 mg PO/IM, Q6H PRN for psychotic agitation, Lorazepam 2mg IM, PRN for severe agitation, Benadryl 50mg PO/IM, Q6H PRN for EPS ppx.  -  Individual, group, milieu therapy.  -  Psychoeducation provided to patient regarding indication for medications, alternatives, side effects, adherence.    #Medical:  -  Continue COVID quarantine until end of 09/18/23.  -  No acute concerns. tylenol prn for pain   -  Routine labs  -  Hold antipsychotics if QTc >500.    #Legal: 9.27    #Disposition: Collateral and disposition planning pending further symptom and medication optimization.  Patient is a 27 y/o male, single, non-caregiver, lives at home with aunt and family, PPH schizophrenia, 3 prior inpatient psychiatric hospitalizations (2018 [facility], 2023 [place], last at [facility] in June 2023), followed by Dr. Alan Chan at [facility] for outpatient care (no showed August appt), taking Invega Sustenna, no history of SA or violence, no known history of substance use, who presents to the [facility] ED on 9/4/2023 brought in by ROCKY and JUSTIN after he was discovered pouring water on electrical outlets in his home.     On assessment, patient appears depressed with paranoid beliefs and flat affect. According to previous records and CVM, he was last administered Invega Sustenna 117mg IM q4 weekly on 08/21/23. Dr. Chan contacted for more recommendations, who suggests increasing Invega Sustenna to 156mg. Patient continues to exhibit symptoms of psychosis, warranting ongoing hospitalization for symptom stabilization with medication optimization, and to coordinate aftercare services. Physical examination reveals low concern for catatonia at this time; patient's physical isolation and lack of activity are likely negative manifestations of schizophrenia. Patient's COVID test was positive yesterday 09/14/23; he will thus remain in quarantine until the end of Monday 09/18/23. Patient will also receive Invega Sustenna 156mg IM next Monday 09/18/23. Plan as below.    Working Diagnoses:  Schizophrenia    On assessment, patient was guarded and paranoia suspected. Continues to exhibit negative symptoms.    PLAN:  #Safety: q15 obs    #Psychopharm:  -  Continue Invega PO 9mg qhs   -  Administer Invega Sustenna 156mg IM next Monday 09/18/23.  -  PRNs: Haldol 5 mg PO/IM, Q6H PRN for psychotic agitation, Lorazepam 2mg IM, PRN for severe agitation, Benadryl 50mg PO/IM, Q6H PRN for EPS ppx.  -  Individual, group, milieu therapy.  -  Psychoeducation provided to patient regarding indication for medications, alternatives, side effects, adherence.    #Medical:  -  Continue COVID quarantine until end of 09/18/23.  -  No acute concerns. tylenol prn for pain   -  Routine labs  -  Hold antipsychotics if QTc >500.    #Legal: 9.27    #Disposition: Collateral and disposition planning pending further symptom and medication optimization.  Patient is a 29 y/o male, single, non-caregiver, lives at home with aunt and family, PPH schizophrenia, 3 prior inpatient psychiatric hospitalizations (2018 [facility], 2023 [place], last at [facility] in June 2023), followed by Dr. Alan Chan at [facility] for outpatient care (no showed August appt), taking Invega Sustenna, no history of SA or violence, no known history of substance use, who presents to the [facility] ED on 9/4/2023 brought in by ROCKY and JUSTIN after he was discovered pouring water on electrical outlets in his home.     patient's social isolation and lack of activity are likely negative manifestations of schizophrenia. Patient's COVID test was positive yesterday 09/14/23; he will thus remain in quarantine until the end of Monday 09/18/23. Patient will also receive Invega Sustenna 156mg IM next Monday 09/18/23. Plan as below.    Working Diagnoses:  Schizophrenia    On assessment, patient was guarded, remains in his room, limited interaction with team with responses that are limited to 1-2 words. denies all symptoms of psychosis. Continues to exhibit negative symptoms. Invega Sustenna 156mg next due 9/18    PLAN:  #Safety: q15 obs    #Psychopharm:  -  Continue Invega PO 9mg qhs   -  Administer Invega Sustenna 156mg IM next Monday 09/18/23.  -  PRNs: Haldol 5 mg PO/IM, Q6H PRN for psychotic agitation, Lorazepam 2mg IM, PRN for severe agitation, Benadryl 50mg PO/IM, Q6H PRN for EPS ppx.  -  Individual, group, milieu therapy.  -  Psychoeducation provided to patient regarding indication for medications, alternatives, side effects, adherence.    #Medical:  -  Continue COVID quarantine until end of 09/18/23.  -  No acute concerns. tylenol prn for pain   -  Routine labs  -  Hold antipsychotics if QTc >500.    #Legal: 9.27    #Disposition: Collateral and disposition planning pending further symptom and medication optimization.  Patient is a 27 y/o male, single, non-caregiver, lives at home with aunt and family, PPH schizophrenia, 3 prior inpatient psychiatric hospitalizations (2018 [facility], 2023 [place], last at [facility] in June 2023), followed by Dr. Alan Chan at [facility] for outpatient care (no showed August appt), taking Invega Sustenna, no history of SA or violence, no known history of substance use, who presents to the [facility] ED on 9/4/2023 brought in by ROCKY and JUSTIN after he was discovered pouring water on electrical outlets in his home.     patient's social isolation and lack of activity are likely negative manifestations of schizophrenia. Patient's COVID test was positive yesterday 09/14/23; he will thus remain in quarantine until the end of Monday 09/18/23. Patient will also receive Invega Sustenna 156mg IM next Monday 09/18/23. Plan as below.    Working Diagnoses:  Schizophrenia    On assessment, patient was guarded, remains in his room, limited interaction with team with responses that are limited to 1-2 words. Denies all symptoms of psychosis. Continues to exhibit negative symptoms. Invega Sustenna 156mg next due 9/18. HAM-D score of 12, indicating mild depression. Patient is amenable to starting Lexapro 5mg following counselling and psychoeducation.    PLAN:  #Safety: q15 obs    #Psychopharm:  -  Continue Invega PO 9mg qhs   -  Start Lexapro 5mg PO.  -  Administer Invega Sustenna 156mg IM next Monday 09/18/23.  -  PRNs: Haldol 5 mg PO/IM, Q6H PRN for psychotic agitation, Lorazepam 2mg IM, PRN for severe agitation, Benadryl 50mg PO/IM, Q6H PRN for EPS ppx.  -  Individual, group, milieu therapy.  -  Psychoeducation provided to patient regarding indication for medications, alternatives, side effects, adherence.    #Medical:  -  Continue COVID quarantine until end of 09/18/23.  -  No acute concerns. tylenol prn for pain   -  Routine labs  -  Hold antipsychotics if QTc >500.    #Legal: 9.27    #Disposition: Collateral and disposition planning pending further symptom and medication optimization.

## 2023-09-15 NOTE — BH INPATIENT PSYCHIATRY PROGRESS NOTE - NSBHFUPINTERVALHXFT_PSY_A_CORE
On evaluation this morning, patient is found lying in his bed with his blankets wrapped around his head, similar to yesterday. Patient is arousable to voice but noted to respond with one-word answers to questions with low speech productivity. Patient states that he had a decent sleep and ate pizza for dinner last night. When asked whether the patient has walked around the unit and interacted with others, the patient responds in the affirmative and states that he went out to the [facility] yesterday. Patient strongly encouraged to get out of bed and walk around the unit. He adds that he has not yet shaved his beard or trimmed his fingernails.    When asked about his relationship with his aunt and whether that has improved, patient responds, "Not sure." When asked about what the patient wants to do after he is discharged in the future, he replies that he wants to go to "school" in order to develop his "skills" but does not elaborate any further. He denies any SI, HI, or hallucinations at this time. On evaluation this morning, patient is found lying in his bed with his blankets wrapped around his head, similar to yesterday. Patient is arousable to voice but noted to respond with one-word answers to questions with low speech productivity. Patient states that he had a decent sleep and ate pizza for dinner last night. When asked whether the patient has walked around the unit and interacted with others, patient reports that he spent the majority of yesterday in his bed. When asked why he remained in bed for most of yesterday, he states that he did this because he was "tired." Patient strongly encouraged to get out of bed and walk around the unit and to take advantage of the [facility] area. He adds that he has not yet shaved his beard or trimmed his fingernails; patient was reinformed that he can shave at 0900 and 2100 under supervision.    When asked about his relationship with his aunt and whether that has improved, patient responds, "Not sure." When asked about what the patient wants to do after he is discharged in the future, he replies that he wants to go to "school" in order to develop his "skills" but does not elaborate any further. He denies any SI, HI, or hallucinations at this time. On evaluation this morning, patient is found lying in his bed with his blankets wrapped around his head, similar to yesterday. Patient is arousable to voice but noted to respond with one-word answers to questions with low speech productivity. Patient states that he had a decent sleep and ate pizza for dinner last night. When asked whether the patient has walked around the unit and interacted with others, patient reports that he spent the majority of yesterday in his bed. When asked why he remained in bed for most of yesterday, he states that he did this because he was "tired." Patient strongly encouraged to get out of bed and walk around the unit and to take advantage of the [facility] area. He adds that he has not yet shaved his beard or trimmed his fingernails; patient was reinformed that he can shave at 0900 and 2100 under supervision.    When asked about his relationship with his aunt and whether that has improved, patient responds, "Not sure." When asked about what the patient wants to do after he is discharged in the future, he replies that he wants to go to "school" in order to develop his "skills" but does not elaborate any further. He denies any SI, HI, or hallucinations at this time.    Patient was reevaluated in the afternoon and HAM-D performed with a resulting score of 12, indicating mild depression. Throughout examination, patient was noted to be lying in bed with this blankets around his face, similar to his positioning this morning.    Upon discussion, patient states that he took Prozac in the past with mild relief but discontinued the medication at the request of his "outpatient doctor." The patient denies that he stopped taking Prozac due to side effects such as dry eyes, which is mentioned in his previous charts. The patient was then asked whether he is amenable to adding Lexapro 5mg to his daily medication regimen. Psychoeducation was provided, and the patient was counselled in regard to side effects, risks, benefits, and clinical indications of starting Lexapro. Upon further discussion, patient states that he is agreeable to starting Lexapro 5mg for his depressive symptoms.

## 2023-09-15 NOTE — BH INPATIENT PSYCHIATRY PROGRESS NOTE - NSBHCHARTREVIEWVS_PSY_A_CORE FT
Vital Signs Last 24 Hrs  T(C): 36.8 (09-15-23 @ 07:36), Max: 36.8 (09-15-23 @ 07:36)  T(F): 98.2 (09-15-23 @ 07:36), Max: 98.2 (09-15-23 @ 07:36)  HR: --  BP: --  BP(mean): --  RR: --  SpO2: --    Orthostatic VS  09-15-23 @ 07:36  Lying BP: --/-- HR: --  Sitting BP: 128/70 HR: 58  Standing BP: 109/61 HR: 101  Site: --  Mode: --

## 2023-09-16 RX ADMIN — PALIPERIDONE 9 MILLIGRAM(S): 1.5 TABLET, EXTENDED RELEASE ORAL at 20:52

## 2023-09-16 RX ADMIN — ESCITALOPRAM OXALATE 5 MILLIGRAM(S): 10 TABLET, FILM COATED ORAL at 09:20

## 2023-09-17 RX ADMIN — PALIPERIDONE 9 MILLIGRAM(S): 1.5 TABLET, EXTENDED RELEASE ORAL at 21:37

## 2023-09-18 DIAGNOSIS — F29 UNSPECIFIED PSYCHOSIS NOT DUE TO A SUBSTANCE OR KNOWN PHYSIOLOGICAL CONDITION: ICD-10-CM

## 2023-09-18 DIAGNOSIS — F39 UNSPECIFIED MOOD [AFFECTIVE] DISORDER: ICD-10-CM

## 2023-09-18 RX ORDER — ESCITALOPRAM OXALATE 10 MG/1
10 TABLET, FILM COATED ORAL DAILY
Refills: 0 | Status: DISCONTINUED | OUTPATIENT
Start: 2023-09-19 | End: 2023-09-25

## 2023-09-18 RX ADMIN — PALIPERIDONE 9 MILLIGRAM(S): 1.5 TABLET, EXTENDED RELEASE ORAL at 20:35

## 2023-09-18 RX ADMIN — ESCITALOPRAM OXALATE 5 MILLIGRAM(S): 10 TABLET, FILM COATED ORAL at 09:18

## 2023-09-18 RX ADMIN — PALIPERIDONE 156 MILLIGRAM(S): 1.5 TABLET, EXTENDED RELEASE ORAL at 13:28

## 2023-09-18 NOTE — BH INPATIENT PSYCHIATRY PROGRESS NOTE - NSBHFUPINTERVALHXFT_PSY_A_CORE
On evaluation this morning, patient is found lying in bed with the covers over his head, similar to his presentation last Friday morning 09/15/23. Patient is arousable to his name and reports that he had an uneventful weekend. When asked about whether he has gotten out of bed and walked around the unit, the patient responds in the affirmative and states that the weather was "cold." Patient strongly encouraged to get out of bed and walk around the unit.    When asked about the Lexapro 5mg that was started this past Friday 09/15/23, patient reports that he has been having difficulty "going to the bathroom" since taking the medication and has noticed constipation. Patient informed that this is likely unrelated to the Lexapro, and patient also counselled in regard to maintaining proper health via physical activity.

## 2023-09-18 NOTE — BH INPATIENT PSYCHIATRY PROGRESS NOTE - CURRENT MEDICATION
MEDICATIONS  (STANDING):  escitalopram 5 milliGRAM(s) Oral daily  paliperidone ER 9 milliGRAM(s) Oral at bedtime  paliperidone Injectable, Long Acting 156 milliGRAM(s) IntraMuscular once    MEDICATIONS  (PRN):  acetaminophen     Tablet .. 650 milliGRAM(s) Oral every 6 hours PRN Mild Pain (1 - 3), Moderate Pain (4 - 6)  diphenhydrAMINE 50 milliGRAM(s) Oral every 6 hours PRN agitation  diphenhydrAMINE Injectable 50 milliGRAM(s) IntraMuscular once PRN agitation  haloperidol     Tablet 5 milliGRAM(s) Oral every 6 hours PRN agitation  haloperidol    Injectable 5 milliGRAM(s) IntraMuscular once PRN agitation  LORazepam     Tablet 2 milliGRAM(s) Oral every 6 hours PRN Agitation  LORazepam   Injectable 2 milliGRAM(s) IntraMuscular once PRN agitation   MEDICATIONS  (STANDING):  paliperidone ER 9 milliGRAM(s) Oral at bedtime    MEDICATIONS  (PRN):  acetaminophen     Tablet .. 650 milliGRAM(s) Oral every 6 hours PRN Mild Pain (1 - 3), Moderate Pain (4 - 6)  diphenhydrAMINE 50 milliGRAM(s) Oral every 6 hours PRN agitation  diphenhydrAMINE Injectable 50 milliGRAM(s) IntraMuscular once PRN agitation  haloperidol     Tablet 5 milliGRAM(s) Oral every 6 hours PRN agitation  haloperidol    Injectable 5 milliGRAM(s) IntraMuscular once PRN agitation  LORazepam     Tablet 2 milliGRAM(s) Oral every 6 hours PRN Agitation  LORazepam   Injectable 2 milliGRAM(s) IntraMuscular once PRN agitation

## 2023-09-18 NOTE — BH INPATIENT PSYCHIATRY PROGRESS NOTE - NSBHCHARTREVIEWVS_PSY_A_CORE FT
Vital Signs Last 24 Hrs  T(C): 36.7 (09-18-23 @ 08:19), Max: 36.7 (09-18-23 @ 08:19)  T(F): 98 (09-18-23 @ 08:19), Max: 98 (09-18-23 @ 08:19)  HR: --  BP: --  BP(mean): --  RR: --  SpO2: --    Orthostatic VS  09-18-23 @ 08:19  Lying BP: --/-- HR: --  Sitting BP: 120/57 HR: 86  Standing BP: 106/54 HR: 65  Site: --  Mode: --

## 2023-09-18 NOTE — BH INPATIENT PSYCHIATRY PROGRESS NOTE - NSBHASSESSSUMMFT_PSY_ALL_CORE
Patient is a 29 y/o male, single, non-caregiver, lives at home with aunt and family, PPH schizophrenia, 3 prior inpatient psychiatric hospitalizations (2018 [facility], 2023 [place], last at [facility] in June 2023), followed by Dr. Alan Chan at [facility] for outpatient care (no showed August appt), taking Invega Sustenna, no history of SA or violence, no known history of substance use, who presents to the [facility] ED on 9/4/2023 brought in by ROCKY and JUSTIN after he was discovered pouring water on electrical outlets in his home.     Patient's social isolation and lack of activity are likely negative manifestations of schizophrenia. Patient's last COVID test was positive on 09/14/23; he will thus remain in quarantine until the end of today Monday 09/18/23. Patient will also receive Invega Sustenna 156mg IM today Monday 09/18/23. Plan as below.    Working Diagnoses:  Schizophrenia    On assessment, patient was guarded, remains in his room, limited interaction with team with responses that are limited to 1-2 words. Continues to exhibit negative symptoms. Invega Sustenna 156mg due today 9/18.    PLAN:  #Safety: q15 obs    #Psychopharm:  -  Continue Invega PO 9mg qhs   -  Continue Lexapro 5mg PO.  -  Administer Invega Sustenna 156mg IM today 09/18/23.  -  PRNs: Haldol 5 mg PO/IM, Q6H PRN for psychotic agitation, Lorazepam 2mg IM, PRN for severe agitation, Benadryl 50mg PO/IM, Q6H PRN for EPS ppx.  -  Individual, group, milieu therapy.  -  Psychoeducation provided to patient regarding indication for medications, alternatives, side effects, adherence.    #Medical:  -  Continue COVID quarantine until end of 09/18/23.  -  No acute concerns. tylenol prn for pain   -  Routine labs  -  Hold antipsychotics if QTc >500.    #Legal: 9.27    #Disposition: Collateral and disposition planning pending further symptom and medication optimization.  Patient is a 27 y/o male, single, non-caregiver, lives at home with aunt and family, PPH schizophrenia, 3 prior inpatient psychiatric hospitalizations (2018 [facility], 2023 [place], last at [facility] in June 2023), followed by Dr. Alan Chan at [facility] for outpatient care (no showed August appt), taking Invega Sustenna, no history of SA or violence, no known history of substance use, who presents to the [facility] ED on 9/4/2023 brought in by ROCKY and JUSTIN after he was discovered pouring water on electrical outlets in his home.     On assessment, patient was guarded, remains in his room, limited interaction with team with responses that are limited to 1-2 words. Continues to exhibit negative symptoms. Invega Sustenna 156mg due today 9/18. Increase lexapro to 10mg daily     PLAN:  #Safety: q15 obs    #Psychopharm:  -  Continue Invega PO 9mg qhs   -  Increase Lexapro 10mg PO.  -  Administer Invega Sustenna 156mg IM today 09/18/23.  -  PRNs: Haldol 5 mg PO/IM, Q6H PRN for psychotic agitation, Lorazepam 2mg IM, PRN for severe agitation, Benadryl 50mg PO/IM, Q6H PRN for EPS ppx.  -  Individual, group, milieu therapy.  -  Psychoeducation provided to patient regarding indication for medications, alternatives, side effects, adherence.    #Medical:  -  Continue COVID quarantine until end of 09/18/23.  -  No acute concerns. tylenol prn for pain   -  Routine labs  -  Hold antipsychotics if QTc >500.    #Legal: 9.27    #Disposition: Collateral and disposition planning pending further symptom and medication optimization.

## 2023-09-19 PROCEDURE — 99231 SBSQ HOSP IP/OBS SF/LOW 25: CPT

## 2023-09-19 RX ADMIN — ESCITALOPRAM OXALATE 10 MILLIGRAM(S): 10 TABLET, FILM COATED ORAL at 08:12

## 2023-09-19 RX ADMIN — PALIPERIDONE 9 MILLIGRAM(S): 1.5 TABLET, EXTENDED RELEASE ORAL at 21:04

## 2023-09-19 NOTE — BH INPATIENT PSYCHIATRY PROGRESS NOTE - NSBHCHARTREVIEWVS_PSY_A_CORE FT
Vital Signs Last 24 Hrs  T(C): 36.8 (09-19-23 @ 07:36), Max: 36.8 (09-19-23 @ 07:36)  T(F): 98.2 (09-19-23 @ 07:36), Max: 98.2 (09-19-23 @ 07:36)  HR: --  BP: --  BP(mean): --  RR: --  SpO2: --    Orthostatic VS  09-19-23 @ 07:36  Lying BP: --/-- HR: --  Sitting BP: 125/72 HR: 76  Standing BP: 108/68 HR: 96  Site: --  Mode: --  Orthostatic VS  09-18-23 @ 08:19  Lying BP: --/-- HR: --  Sitting BP: 120/57 HR: 86  Standing BP: 106/54 HR: 65  Site: --  Mode: --

## 2023-09-19 NOTE — BH INPATIENT PSYCHIATRY PROGRESS NOTE - CURRENT MEDICATION
MEDICATIONS  (STANDING):  escitalopram 10 milliGRAM(s) Oral daily  paliperidone ER 9 milliGRAM(s) Oral at bedtime    MEDICATIONS  (PRN):  acetaminophen     Tablet .. 650 milliGRAM(s) Oral every 6 hours PRN Mild Pain (1 - 3), Moderate Pain (4 - 6)  diphenhydrAMINE 50 milliGRAM(s) Oral every 6 hours PRN agitation  diphenhydrAMINE Injectable 50 milliGRAM(s) IntraMuscular once PRN agitation  haloperidol     Tablet 5 milliGRAM(s) Oral every 6 hours PRN agitation  haloperidol    Injectable 5 milliGRAM(s) IntraMuscular once PRN agitation  LORazepam     Tablet 2 milliGRAM(s) Oral every 6 hours PRN Agitation  LORazepam   Injectable 2 milliGRAM(s) IntraMuscular once PRN agitation

## 2023-09-19 NOTE — BH INPATIENT PSYCHIATRY PROGRESS NOTE - NSBHFUPINTERVALHXFT_PSY_A_CORE
On evaluation this morning, patient is found lying in bed with his sheets covering his head, similar to his presentation yesterday morning. Patient is arousable to voice and states he had an uneventful sleep. He denies eating breakfast. When asked if he has gotten out of bed, the patient responds in the affirmative and states he walked around the unit yesterday. When asked what the weather was like yesterday, he mutters something unintelligible. When asked whether he believes that he is ready to go home, the patient nods his head and responds "yes." When asked whether this is his baseline condition and whether he will behave similarly to his current presentation when at home, he nods his head and responds "yes."    Patient strongly encouraged to get out of bed and walk around the unit to maintain proper physical health. When asked whether he has shaved, the patient says he has yet to do so.     He denies any SI, HI, or auditory or visual hallucinations at this time. He states that he does not remember the last time he heard voices.

## 2023-09-19 NOTE — BH INPATIENT PSYCHIATRY PROGRESS NOTE - NSBHASSESSSUMMFT_PSY_ALL_CORE
Patient is a 27 y/o male, single, non-caregiver, lives at home with aunt and family, PPH schizophrenia, 3 prior inpatient psychiatric hospitalizations (2018 [facility], 2023 [place], last at [facility] in June 2023), followed by Dr. Alan Chan at [facility] for outpatient care (no showed August appt), taking Invega Sustenna, no history of SA or violence, no known history of substance use, who presents to the [facility] ED on 9/4/2023 brought in by ROCKY and JUSTIN after he was discovered pouring water on electrical outlets in his home.     On assessment, patient was guarded, remains in his room, offers limited interaction with team with responses that are limited to 1-2 words. Continues to exhibit negative symptoms. Invega Sustenna 156mg administered 9/18. Lexapro increased to 10mg daily; consider possibly increasing Lexapro and administering Wellbutrin later during hospital course.    PLAN:  #Safety: q15 obs    #Psychopharm:  -  Continue Invega PO 9mg qhs   -  Continue Lexapro 10mg PO.  -  Invega Sustenna 156mg IM administered 09/18/23.  -  PRNs: Haldol 5 mg PO/IM, Q6H PRN for psychotic agitation, Lorazepam 2mg IM, PRN for severe agitation, Benadryl 50mg PO/IM, Q6H PRN for EPS ppx.  -  Individual, group, milieu therapy.  -  Psychoeducation provided to patient regarding indication for medications, alternatives, side effects, adherence.    #Medical:  -  No acute concerns. tylenol prn for pain   -  Routine labs  -  Hold antipsychotics if QTc >500.    #Legal: 9.27    #Disposition: Collateral and disposition planning pending further symptom and medication optimization.  Patient is a 27 y/o male, single, non-caregiver, lives at home with aunt and family, PPH schizophrenia, 3 prior inpatient psychiatric hospitalizations (2018 [facility], 2023 [place], last at [facility] in June 2023), followed by Dr. Alan Chan at [facility] for outpatient care (no showed August appt), taking Invega Sustenna, no history of SA or violence, no known history of substance use, who presents to the [facility] ED on 9/4/2023 brought in by ROCKY and JUSTIN after he was discovered pouring water on electrical outlets in his home.     On assessment, patient was guarded, remains in his room, offers limited interaction with team with responses that are limited to 1-2 words. Continues to exhibit negative symptoms. Invega Sustenna 156mg administered 9/18. Lexapro increased to 10mg daily and will maintain this for now.     PLAN:  #Safety: q15 obs    #Psychopharm:  -  Continue Invega PO 9mg qhs   -  Continue Lexapro 10mg PO.  -  Invega Sustenna 156mg IM administered 09/18/23.  -  PRNs: Haldol 5 mg PO/IM, Q6H PRN for psychotic agitation, Lorazepam 2mg IM, PRN for severe agitation, Benadryl 50mg PO/IM, Q6H PRN for EPS ppx.  -  Individual, group, milieu therapy.  -  Psychoeducation provided to patient regarding indication for medications, alternatives, side effects, adherence.    #Medical:  -  No acute concerns. tylenol prn for pain   -  Routine labs  -  Hold antipsychotics if QTc >500.    #Legal: 9.27    #Disposition: Collateral and disposition planning pending further symptom and medication optimization.

## 2023-09-20 PROCEDURE — 99231 SBSQ HOSP IP/OBS SF/LOW 25: CPT

## 2023-09-20 RX ADMIN — ESCITALOPRAM OXALATE 10 MILLIGRAM(S): 10 TABLET, FILM COATED ORAL at 08:08

## 2023-09-20 NOTE — BH INPATIENT PSYCHIATRY PROGRESS NOTE - NSBHCHARTREVIEWVS_PSY_A_CORE FT
Vital Signs Last 24 Hrs  T(C): 36.7 (09-20-23 @ 08:19), Max: 36.7 (09-20-23 @ 08:19)  T(F): 98.1 (09-20-23 @ 08:19), Max: 98.1 (09-20-23 @ 08:19)  HR: --  BP: --  BP(mean): --  RR: --  SpO2: --    Orthostatic VS  09-20-23 @ 08:19  Lying BP: --/-- HR: --  Sitting BP: 117/73 HR: 95  Standing BP: --/-- HR: --  Site: --  Mode: --  Orthostatic VS  09-19-23 @ 07:36  Lying BP: --/-- HR: --  Sitting BP: 125/72 HR: 76  Standing BP: 108/68 HR: 96  Site: --  Mode: --

## 2023-09-20 NOTE — BH INPATIENT PSYCHIATRY PROGRESS NOTE - NSBHFUPINTERVALHXFT_PSY_A_CORE
On evaluation, patient is found lying in bed with his sheets covering his head, similar to his presentation yesterday morning. Patient is arousable to voice and reports that he had a decent sleep but did not yet eat breakfast. Patient denies any SI, HI, or AVH at this time; he admits that the last time he heard voices were "before the hospital." When asked about why he constantly covers his head with sheets and blankets, he replies that it makes him feel "comfortable." Patient reports that he has spoken with his aunt and grandmother over the phone, and adds that he no longer feels afraid of his aunt. Patient states that he feels safe returning home as well.    Patient strongly encouraged to get out of bed and walk around the unit in order to maintain his physical and emotional health. Upon being pressed further, patient stood out of bed on his own and paced around the room several times to demonstrate that he can walk. At the end of interview, patient requested breakfast and water. Patient subsequently walked down the hallway without any assistance and was provided with a cup of water, a cup of ginger ale, and a pack of [employer]isha [employer]one shortbread cookies, which the patient took with him back to his room. On evaluation, patient is found lying in bed with his sheets covering his head, similar to his presentation yesterday morning. Patient is arousable to voice and reports that he had a decent sleep but did not yet eat breakfast. Patient denies any SI, HI, or AVH at this time; he admits that the last time he heard voices were "before the hospital." When asked about why he constantly covers his head with sheets and blankets, he replies that it makes him feel "comfortable." Patient reports that he has spoken with his aunt and grandmother over the phone, and adds that he no longer feels afraid of his aunt. Patient states that he feels safe returning home as well.    Patient strongly encouraged to get out of bed and walk around the unit in order to maintain his physical and emotional health. Upon being pressed further, patient stood out of bed on his own and paced around the room several times to demonstrate that he can walk. At the end of interview, patient requested breakfast and water. Patient subsequently walked down the hallway without any assistance and was provided with a cup of water, a cup of ginger ale, and a pack of Amisha Doone shortbread cookies, which the patient took with him back to his room.    Case discussed with aunt Diane Headley ([phone]) this afternoon, who was informed of the patient's behavior over the past several days. Per aunt, patient's behavior throughout his hospitalization appears to be his baseline. Aunt states that patient always "isolates himself" and "does not want to be bothered." She adds that although he has never been violent, he is "scornful" of other people. Aunt additionally reports that it is normal for the patient to cover his face and head with sheets, blankets, or shirts as he does not want to be seen by others. 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 The patient lost his job when the pandemic started in 2020; aunt reports that if he still had his job, then the patient would not have been hospitalized. She also reveals that the patient was enrolled in [employer] for two years and earned a 3.9 GPA; he then transferred to [employer] but later dropped out. She adds that patient does not have a social life or friends. She states, "He does not gravitate toward people" and needs to "pushed toward social activity." Aunt reaffirms that she would like the patient to be discharged and states that the patient would be happiest at home.

## 2023-09-20 NOTE — BH INPATIENT PSYCHIATRY PROGRESS NOTE - NSBHASSESSSUMMFT_PSY_ALL_CORE
Patient is a 29 y/o male, single, non-caregiver, lives at home with aunt and family, PPH schizophrenia, 3 prior inpatient psychiatric hospitalizations (2018 [facility], 2023 [place], last at [facility] in June 2023), followed by Dr. Alan Chan at [facility] for outpatient care (no showed August appt), taking Invega Sustenna, no history of SA or violence, no known history of substance use, who presents to the [facility] ED on 9/4/2023 brought in by ROCKY and JUSTIN after he was discovered pouring water on electrical outlets in his home.     On assessment, patient was guarded and offers limited interaction with team with responses that are limited to 1-2 words. Continues to exhibit negative symptoms; however, patient was able to get out of bed and walk without any assistance, demonstrating a normal gait. He also walked out of his room in order to procure breakfast. Invega Sustenna 156mg administered 9/18. Lexapro increased to 10mg daily and will maintain this for now.     PLAN:  #Safety: q15 obs    #Psychopharm:  -  Continue Invega PO 9mg qhs   -  Continue Lexapro 10mg PO.  -  Invega Sustenna 156mg IM administered 09/18/23.  -  PRNs: Haldol 5 mg PO/IM, Q6H PRN for psychotic agitation, Lorazepam 2mg IM, PRN for severe agitation, Benadryl 50mg PO/IM, Q6H PRN for EPS ppx.  -  Individual, group, milieu therapy.  -  Psychoeducation provided to patient regarding indication for medications, alternatives, side effects, adherence.    #Medical:  -  No acute concerns. tylenol prn for pain   -  Routine labs  -  Hold antipsychotics if QTc >500.    #Legal: 9.27    #Disposition: Collateral and disposition planning pending further symptom and medication optimization.  Patient is a 27 y/o male, single, non-caregiver, lives at home with aunt and family, PPH schizophrenia, 3 prior inpatient psychiatric hospitalizations (2018 [facility], 2023 [place], last at [facility] in June 2023), followed by Dr. Alan Chan at [facility] for outpatient care (no showed August appt), taking Invega Sustenna, no history of SA or violence, no known history of substance use, who presents to the [facility] ED on 9/4/2023 brought in by ROCKY and JUSTIN after he was discovered pouring water on electrical outlets in his home.     On assessment, patient was guarded and offers limited interaction with team with responses that are limited to 1-2 words. Continues to exhibit negative symptoms; however, patient was able to get out of bed and walk without any assistance, demonstrating a normal gait. He also walked out of his room in order to procure breakfast. Invega Sustenna 156mg administered 9/18. Lexapro increased to 10mg daily and will maintain this for now.     PLAN:  #Safety: q15 obs    #Psychopharm:  -  STOP Invega PO   -  Continue Lexapro 10mg PO.  -  Invega Sustenna 156mg IM administered 09/18/23 next due 10/15/23  -  PRNs: Haldol 5 mg PO/IM, Q6H PRN for psychotic agitation, Lorazepam 2mg IM, PRN for severe agitation, Benadryl 50mg PO/IM, Q6H PRN for EPS ppx.  -  Individual, group, milieu therapy.  -  Psychoeducation provided to patient regarding indication for medications, alternatives, side effects, adherence.    #Medical:  -  No acute concerns. tylenol prn for pain   -  Routine labs  -  Hold antipsychotics if QTc >500.    #Legal: 9.27    #Disposition: Collateral and disposition planning pending further symptom and medication optimization.

## 2023-09-20 NOTE — BH INPATIENT PSYCHIATRY PROGRESS NOTE - CURRENT MEDICATION
MEDICATIONS  (STANDING):  escitalopram 10 milliGRAM(s) Oral daily  paliperidone ER 9 milliGRAM(s) Oral at bedtime    MEDICATIONS  (PRN):  acetaminophen     Tablet .. 650 milliGRAM(s) Oral every 6 hours PRN Mild Pain (1 - 3), Moderate Pain (4 - 6)  diphenhydrAMINE 50 milliGRAM(s) Oral every 6 hours PRN agitation  diphenhydrAMINE Injectable 50 milliGRAM(s) IntraMuscular once PRN agitation  haloperidol     Tablet 5 milliGRAM(s) Oral every 6 hours PRN agitation  haloperidol    Injectable 5 milliGRAM(s) IntraMuscular once PRN agitation  LORazepam     Tablet 2 milliGRAM(s) Oral every 6 hours PRN Agitation  LORazepam   Injectable 2 milliGRAM(s) IntraMuscular once PRN agitation   MEDICATIONS  (STANDING):  escitalopram 10 milliGRAM(s) Oral daily    MEDICATIONS  (PRN):  acetaminophen     Tablet .. 650 milliGRAM(s) Oral every 6 hours PRN Mild Pain (1 - 3), Moderate Pain (4 - 6)  diphenhydrAMINE 50 milliGRAM(s) Oral every 6 hours PRN agitation  diphenhydrAMINE Injectable 50 milliGRAM(s) IntraMuscular once PRN agitation  haloperidol     Tablet 5 milliGRAM(s) Oral every 6 hours PRN agitation  haloperidol    Injectable 5 milliGRAM(s) IntraMuscular once PRN agitation  LORazepam     Tablet 2 milliGRAM(s) Oral every 6 hours PRN Agitation  LORazepam   Injectable 2 milliGRAM(s) IntraMuscular once PRN agitation

## 2023-09-21 PROCEDURE — 99231 SBSQ HOSP IP/OBS SF/LOW 25: CPT

## 2023-09-21 RX ADMIN — ESCITALOPRAM OXALATE 10 MILLIGRAM(S): 10 TABLET, FILM COATED ORAL at 09:15

## 2023-09-21 NOTE — BH TREATMENT PLAN - NSTXPATIENTPARTICIPATE_PSY_ALL_CORE
Patient participated in defining interventions

## 2023-09-21 NOTE — BH INPATIENT PSYCHIATRY PROGRESS NOTE - NSBHFUPINTERVALHXFT_PSY_A_CORE
On evaluation this morning, patient is found lying in bed with his sheets and blankets covering his head, similar to his presentation yesterday morning. Patient is arousable to voice and able to sit up in bed for interview. Patient continues to speak in primarily one-word responses, and reports that he had a decent sleep last night. He states that he has not yet eaten breakfast but claims he walked around his room yesterday.     Patient denies any SI, HI, or AVH at this time. When asked about the last time has heard any voices, the patient states it has been "a while." He later clarifies that he has not heard any voices since his hospitalization at . Patient informed that the psychiatry care team communicated with his aunt yesterday, and her own concerns about his health were relayed to him as well. Upon further discussion, patient states that he feels safe returning home and no longer feels unsafe around his aunt. When asked about what he would like to do when he returns home, he states that he is looking forward to submitting job applications. Patient strongly encouraged to eat breakfast and walk around the unit in order to maintain his physical health.    Case communicated with patient's grandmother Emeli OJEDA ([phone]), who is inquiring about the patient's health and potential discharge. Grandmother informed that the patient is currently taking Lexapro and that his condition has been improving and returning to baseline. She was also informed that the patient will continue to be monitored for a few more days with potential discharge sometime next week. Grandmother expresses understanding as well as appreciation for being a part of the patient's care plan.

## 2023-09-21 NOTE — BH INPATIENT PSYCHIATRY PROGRESS NOTE - NSBHASSESSSUMMFT_PSY_ALL_CORE
Patient is a 29 y/o male, single, non-caregiver, lives at home with aunt and family, PPH schizophrenia, 3 prior inpatient psychiatric hospitalizations (2018 [facility], 2023 [place], last at [facility] in June 2023), followed by Dr. Alan Chan at [facility] for outpatient care (no showed August appt), taking Invega Sustenna, no history of SA or violence, no known history of substance use, who presents to the [facility] ED on 9/4/2023 brought in by ROCKY and JUSTIN after he was discovered pouring water on electrical outlets in his home.     On assessment, patient was guarded and offers limited interaction with team with responses that are limited to 1-2 words. Continues to exhibit negative symptoms; however, patient was able to get out of bed and walk without any assistance, demonstrating a normal gait. He also walked out of his room in order to procure breakfast. Invega Sustenna 156mg administered 9/18. Lexapro increased to 10mg daily and will maintain this for now.     PLAN:  #Safety: q15 obs    #Psychopharm:  -  STOP Invega PO   -  Continue Lexapro 10mg PO.  -  Invega Sustenna 156mg IM administered 09/18/23 next due 10/15/23  -  PRNs: Haldol 5 mg PO/IM, Q6H PRN for psychotic agitation, Lorazepam 2mg IM, PRN for severe agitation, Benadryl 50mg PO/IM, Q6H PRN for EPS ppx.  -  Individual, group, milieu therapy.  -  Psychoeducation provided to patient regarding indication for medications, alternatives, side effects, adherence.    #Medical:  -  No acute concerns. tylenol prn for pain   -  Routine labs  -  Hold antipsychotics if QTc >500.    #Legal: 9.27    #Disposition: Collateral and disposition planning pending further symptom and medication optimization.  Patient is a 27 y/o male, single, non-caregiver, lives at home with aunt and family, PPH schizophrenia, 3 prior inpatient psychiatric hospitalizations (2018 [facility], 2023 [place], last at [facility] in June 2023), followed by Dr. Alan Chan at [facility] for outpatient care (no showed August appt), taking Invega Sustenna, no history of SA or violence, no known history of substance use, who presents to the [facility] ED on 9/4/2023 brought in by ROCKY and JUSTIN after he was discovered pouring water on electrical outlets in his home.     On assessment, patient was guarded and offers limited interaction with team with responses that are limited to 1-2 words. Continues to exhibit negative symptoms but seems more improved over the past two days. Invega Sustenna 156mg administered 9/18. Lexapro increased to 10mg daily and will maintain this for now.     PLAN:  #Safety: q15 obs    #Psychopharm:  -  STOP Invega PO   -  Continue Lexapro 10mg PO.  -  Invega Sustenna 156mg IM administered 09/18/23 next due 10/15/23  -  PRNs: Haldol 5 mg PO/IM, Q6H PRN for psychotic agitation, Lorazepam 2mg IM, PRN for severe agitation, Benadryl 50mg PO/IM, Q6H PRN for EPS ppx.  -  Individual, group, milieu therapy.  -  Psychoeducation provided to patient regarding indication for medications, alternatives, side effects, adherence.    #Medical:  -  No acute concerns. tylenol prn for pain   -  Routine labs  -  Hold antipsychotics if QTc >500.    #Legal: 9.27    #Disposition: Collateral and disposition planning pending further symptom and medication optimization.  Patient is a 27 y/o male, single, non-caregiver, lives at home with aunt and family, PPH schizophrenia, 3 prior inpatient psychiatric hospitalizations (2018 [facility], 2023 [place], last at [facility] in June 2023), followed by Dr. Alan Chan at [facility] for outpatient care (no showed August appt), taking Invega Sustenna, no history of SA or violence, no known history of substance use, who presents to the [facility] ED on 9/4/2023 brought in by ROCKY and JUSTIN after he was discovered pouring water on electrical outlets in his home.     On assessment, patient was guarded and offers limited interaction with team with responses that are limited to 1-2 words. Continues to exhibit negative symptoms but seems more improved over the past two days. Invega Sustenna 156mg administered 9/18. Lexapro increased to 10mg daily and will maintain this for now.     PLAN:  #Safety: q15 obs    #Psychopharm:    -  Continue Lexapro 10mg PO.  -  Invega Sustenna 156mg IM administered 09/18/23 next due 10/15/23  -  PRNs: Haldol 5 mg PO/IM, Q6H PRN for psychotic agitation, Lorazepam 2mg IM, PRN for severe agitation, Benadryl 50mg PO/IM, Q6H PRN for EPS ppx.  -  Individual, group, milieu therapy.  -  Psychoeducation provided to patient regarding indication for medications, alternatives, side effects, adherence.    #Medical:  -  No acute concerns. tylenol prn for pain   -  Routine labs  -  Hold antipsychotics if QTc >500.    #Legal: 9.27    #Disposition: Collateral and disposition planning pending further symptom and medication optimization.

## 2023-09-21 NOTE — BH TREATMENT PLAN - NSPTSTATEDGOAL_PSY_ALL_CORE
*does not say 
"I want to find an apartment and move out of my family's house"/
I want to go home and get a job

## 2023-09-21 NOTE — BH INPATIENT PSYCHIATRY PROGRESS NOTE - NSBHCHARTREVIEWVS_PSY_A_CORE FT
Vital Signs Last 24 Hrs  T(C): 36.7 (09-21-23 @ 07:51), Max: 36.7 (09-21-23 @ 07:51)  T(F): 98 (09-21-23 @ 07:51), Max: 98 (09-21-23 @ 07:51)  HR: --  BP: --  BP(mean): --  RR: --  SpO2: --    Orthostatic VS  09-21-23 @ 07:51  Lying BP: --/-- HR: --  Sitting BP: 119/68 HR: 66  Standing BP: 120/71 HR: 69  Site: --  Mode: --  Orthostatic VS  09-20-23 @ 08:19  Lying BP: --/-- HR: --  Sitting BP: 117/73 HR: 95  Standing BP: --/-- HR: --  Site: --  Mode: --

## 2023-09-21 NOTE — BH TREATMENT PLAN - NSTXDCOPNOINTERSW_PSY_ALL_CORE
SW will encourage pt to engage in outpatient treatment providers.
SW will provide support, psychoeducation and safe discharge planning while maintaining communication withidentified supports.
SW participated in interdisciplinary treatment team meetings to coordinate patient care and discharge plans.  SW maintained contact with patient's Aunt and provided support to patient.

## 2023-09-21 NOTE — BH TREATMENT PLAN - NSTXPLANTHERAPYSESSIONSFT_PSY_ALL_CORE
09-12-23  Type of therapy: N/A  Type of session: Individual  Level of patient participation: Not engaged  Duration of participation: Less than 15 minutes  Therapy conducted by: Psych rehab  Therapy Summary: Writer attempted to meet with patient to assess patients progress towards psychiatric rehabilitation goal over the past seven days. At each attempt, patient was asleep and unable to be roused. Due to this, the following information has been gathered from patients chart. Patient has been medication compliant. Patient recently tested positive for COVID and is currently residing on the [address]. Patient has been isolative to his room, minimally engaged with staff and not engaged with peers. Patient is often observed laying in bed with a blanket over his head. Over the past seven days, patient has been working on the goal of identifying 2-3 current symptoms to staff. Patient has demonstrated minimal progress towards this goal. Per chart, patient appears to demonstrate minimal insight into psychotic symptoms and reasons for admission. Patient has been observed to provide minimal/one worded responses and to be guarded with staff. Although patient has not been able to identify symptoms, patient has demonstrated a reduction in psychotic symptoms compared to admission. Patient will continue to work on this goal for the next seven days. Due to patient being asleep, writer was unable to assess for SI/HI/AVH. Patient has not engaged with staff during rounding sessions on [facility] side of [facility]. Patient demonstrates appropriate ADL’s. Patient has been in good behavioral control. Psych rehab will continue to work with patient to build therapeutic rapport.  
  09-19-23  Type of therapy: Other, None  Type of session: Individual  Level of patient participation: Participated with encouragement, Quiet  Duration of participation: Less than 15 minutes  Therapy conducted by: Psych rehab  Therapy Summary: Upon approaching the patient, patient was willing to meet with writer and was minimally verbal throughout the session. Patient was noted to be wrapped in blanket throughout interview. When asked about overall mood, patient stated that "he's fine". Patient and writer spoke in session about patient's progress over this past interval. Upon discussing goal progress, patient discussed not maintaining ADLs as recurrent symptoms from his condition. The therapeutic focus on this session was providing patient with support and validation, as well as a safe space to share feelings. Writer also encouraged patient to engage in rounding sessions and be more active on the milieu. Writer also conducted safety planning with patient, however this was limited due to patient being minimally verbal. Overall, throughout session, patient was guarded, minimally verbal, but has shown progress towards psychiatric goal. Patient’s appearance was unkempt and he was observed to be dressed in hospital attire. Patient has not been keeping up with daily grooming habits. Patient attends no groups and is seen rarely on the milieu. Denies SI/HI/AVH.

## 2023-09-21 NOTE — BH TREATMENT PLAN - NSTXPSYCHOINTERPR_PSY_ALL_CORE
Psych rehab recommends that patient attend individual and group therapy for support, psychoeducation and skill-integration as well as to facilitate progress towards specified goal.
Patient has demonstated minimal progress towards this goal. Psych rehab recommends patient increases engagement with staff in order for patient to gain psychoeducation, psychotherapy and support over the next seven days.
Pt would benefit from identifying 2-3 current symptoms to staff by day seven. Writer has encouraged pt to attend daily symptom management group and engage in individual therapy session to achieve his goal.

## 2023-09-21 NOTE — BH TREATMENT PLAN - NSTXPSYCHOGOALOTHER_PSY_ALL_CORE
Will identify 2-3 recurrent symptoms to staff.
Will identify 2-3 current symptoms to staff.
Will identify 2-3 current symptoms to staff

## 2023-09-21 NOTE — BH INPATIENT PSYCHIATRY PROGRESS NOTE - CURRENT MEDICATION
MEDICATIONS  (STANDING):  escitalopram 10 milliGRAM(s) Oral daily    MEDICATIONS  (PRN):  acetaminophen     Tablet .. 650 milliGRAM(s) Oral every 6 hours PRN Mild Pain (1 - 3), Moderate Pain (4 - 6)  diphenhydrAMINE 50 milliGRAM(s) Oral every 6 hours PRN agitation  diphenhydrAMINE Injectable 50 milliGRAM(s) IntraMuscular once PRN agitation  haloperidol     Tablet 5 milliGRAM(s) Oral every 6 hours PRN agitation  haloperidol    Injectable 5 milliGRAM(s) IntraMuscular once PRN agitation  LORazepam     Tablet 2 milliGRAM(s) Oral every 6 hours PRN Agitation  LORazepam   Injectable 2 milliGRAM(s) IntraMuscular once PRN agitation

## 2023-09-22 RX ADMIN — ESCITALOPRAM OXALATE 10 MILLIGRAM(S): 10 TABLET, FILM COATED ORAL at 09:15

## 2023-09-22 NOTE — BH INPATIENT PSYCHIATRY PROGRESS NOTE - NSBHATTESTCOMMENTATTENDFT_PSY_A_CORE
Pt adherent to medications. Sleeping well through the night. Pt socially isolated and remains mostly to himself. He is unsure of his feelings towards his aunt and understands that she may be looking out for his best interest but still feels suspicious. He denies AVH. there is no latency to his speech, he is eating meals, not rigid on exam, is able to follow directions, less likely catatonia more likely negative symptoms. Will maintain patient on Invega 9mg PO dose and will administer Invega Sustenna 156mg IM next week 
No behavioral issues. Pt adherent to treatment. Pt remains socially isolated, keeps his face covered during interview. Denies AVH. continues to have paranoid thoughts related to his aunt. Will increase Invega to 9mg qhs. Will administer Invega Sustenna 156mg IM next Monday. 
Better engaged today and relatively more spontaneous with his responses and his affect appears brighter. He has no complaints, denies AVH, denies SI and HI. While remains in his bed, he was more open to getting out of bed and walking around. Will maintain medications as above 
Pt continues to engage better, responses still limited to a few words. Patient denies AVH. No a/e to medications. No EPS. Plan as above 
Pt continues to engage better, responses still limited to a few words, his affect appears brighter, he reports making efforts to get out of bed and walk around. Patient denies AVH reports last AH he cannot remember when. No a/e to medications. Plan as above 
No behavioral issues. Pt adherent to treatment. Stays to himself and overall, socially isolated and does not leave his room much despite encouragement. He notes feeling “fine,” denies AVH, denies SI and HI. He is informed of his next Invega Sustenna dose on Monday and he is agreeable. 
No behavioral issues. Remains to himself and in his bed throughout the day. Interview limited to 1-2 words, has no complaints, makes vague mention of possible AH, denies command type, denies SI and HI. No a/e to medications. He asks to speak with s/w regarding potential options / benefits he might be eligible for. Invega Sustenna 156mg IM due next Monday. COVID PCR ordered for tomorrow AM. Will f/u 
continues to demonstrate negative symptoms. patient adherent to medications. tolerating lexapro. self-care remains somewhat limited and pt requires encouragement to get out of bed, walk around, etc. 
Patient has no complaints. Remains socially isolated and in his room. He asked to shave but has not done so after supervised sharps order was given. He denies AVH. poor eye contact. Will increase Lexapro to 10mg daily 
Patient remains mostly to himself and in his room. Patient denies current AVH but states he heard voices just before coming to the hospital. He is making efforts to tend to his ADLs and asks for shave order. Denies SI and HI. Pt encouraged to utilize milieu, attend groups, come out of his room, etc. negative symptoms more prominent than positive symptoms at this time. Will maintain Invega at 9mg qhs.  
Patient transferred from low 4 due to testing positive for COVID. He corroborates hx, states he is in the hospital due to family disagreement, feels his aunt does not like and expresses other paranoid sentiments towards family. He denies AVH, denies overtly feeling paranoid and appears to have limited insight into psychosis. He feels he is struggling with depression. He is guarded on interview, minimally engaged, speaking softly and responding minimally with 1-2 words. He is possibly internally preoccupied though he overtly denies AVH. He is adherent to Invega and will continue titration and increase to 6mg qhs. Team attempting to contact outpatient provider for further collateral

## 2023-09-22 NOTE — BH DISCHARGE NOTE NURSING/SOCIAL WORK/PSYCH REHAB - DISCHARGE INSTRUCTIONS AFTERCARE APPOINTMENTS
In order to check the location, date, or time of your aftercare appointment, please refer to your Discharge Instructions Document given to you upon leaving the hospital.  If you have lost the instructions please call [phone]

## 2023-09-22 NOTE — BH DISCHARGE NOTE NURSING/SOCIAL WORK/PSYCH REHAB - NSDCPRRECOMMEND_PSY_ALL_CORE
Psychiatric rehabilitation team recommends patient to continue in outpatient services for symptom management and skills development.

## 2023-09-22 NOTE — BH DISCHARGE NOTE NURSING/SOCIAL WORK/PSYCH REHAB - NSCDUDCCRISIS_PSY_A_CORE
[facility]  1 [phone] [facility] [phone])  Text "WELL" to 74253  Website: www.[employer]/.[employer] [facility] 1 (522) 594-NBBB (9271) Website: www.safehorizon.org/.National Suicide Prevention Lifeline 4 (134) 434-6886/.  [facility]  [address] [place] [id number] [phone]   Hours:  Monday through Friday from 9 AM to 3 PM/988 Suicide and Crisis Lifeline

## 2023-09-22 NOTE — BH INPATIENT PSYCHIATRY PROGRESS NOTE - NSBHASSESSSUMMFT_PSY_ALL_CORE
Patient is a 29 y/o male, single, non-caregiver, lives at home with aunt and family, PPH schizophrenia, 3 prior inpatient psychiatric hospitalizations ([facility], 2023 [place], last at [facility] in June 2023), followed by Dr. Alan Chan at [facility] for outpatient care (no showed August appt), taking Invega Sustenna, no history of SA or violence, no known history of substance use, who presents to the [facility] ED on 9/4/2023 brought in by ROCKY and JUSTIN after he was discovered pouring water on electrical outlets in his home.     On assessment, patient was guarded and offers limited interaction with team with responses that are limited to 1-2 words. Continues to exhibit negative symptoms but seems more improved over the past three days. Invega Sustenna 156mg administered 9/18. Lexapro increased to 10mg daily and will maintain this for now.     PLAN:  #Safety: q15 obs    #Psychopharm:    -  Continue Lexapro 10mg PO.  -  Invega Sustenna 156mg IM administered 09/18/23 next due 10/15/23  -  PRNs: Haldol 5 mg PO/IM, Q6H PRN for psychotic agitation, Lorazepam 2mg IM, PRN for severe agitation, Benadryl 50mg PO/IM, Q6H PRN for EPS ppx.  -  Individual, group, milieu therapy.  -  Psychoeducation provided to patient regarding indication for medications, alternatives, side effects, adherence.    #Medical:  -  No acute concerns. tylenol prn for pain   -  Routine labs  -  Hold antipsychotics if QTc >500.    #Legal: 9.27    #Disposition: Collateral and disposition planning pending further symptom and medication optimization.

## 2023-09-22 NOTE — BH DISCHARGE NOTE NURSING/SOCIAL WORK/PSYCH REHAB - PATIENT PORTAL LINK FT
You can access the FollowMyHealth Patient Portal offered by [facility] by registering at the following website: [email or web address]. By joining [facility]’s FollowMyHealth portal, you will also be able to view your health information using other applications (apps) compatible with our system.

## 2023-09-22 NOTE — BH DISCHARGE NOTE NURSING/SOCIAL WORK/PSYCH REHAB - NSDCADDINFO1FT_PSY_ALL_CORE
in person This is an in person appointment with your previous treatment provider, Dustin Aguilar at [facility].  This is an in person appointment on Wednesday, October 4th, with your previous treatment provider, Dustin Aguilar at [facility].

## 2023-09-22 NOTE — BH INPATIENT PSYCHIATRY PROGRESS NOTE - NSBHFUPINTERVALHXFT_PSY_A_CORE
On evaluation this morning, patient is found lying in bed with his sheets and blankets wrapped around his head, similar to his presentation yesterday morning. Patient is arousable to voice and states that he had a decent sleep but has yet to eat breakfast. Patient denies SI, HI, and AVH at this time; he insists that the last time he heard voices was prior to hospitalization and has not heard any voices during his stay at . Patient noted to continue to speak in largely one-word responses, and he denies any complaints or side effects to his medications.     Patient informed that the psychiatry team spoke with his grandmother yesterday, and her concern for the patient was relayed to him as well. When asked what he would like to do when he returns home, he states that he would "like to play video games." Patient strongly encouraged to walk around the unit and get out of bed in order to maintain his physical health.

## 2023-09-22 NOTE — BH INPATIENT PSYCHIATRY PROGRESS NOTE - NSBHCHARTREVIEWVS_PSY_A_CORE FT
Vital Signs Last 24 Hrs  T(C): 36.6 (09-22-23 @ 08:45), Max: 36.6 (09-22-23 @ 08:45)  T(F): 97.8 (09-22-23 @ 08:45), Max: 97.8 (09-22-23 @ 08:45)  HR: --  BP: --  BP(mean): --  RR: --  SpO2: --    Orthostatic VS  09-22-23 @ 08:45  Lying BP: --/-- HR: --  Sitting BP: 121/68 HR: 80  Standing BP: 118/61 HR: 67  Site: --  Mode: --  Orthostatic VS  09-21-23 @ 07:51  Lying BP: --/-- HR: --  Sitting BP: 119/68 HR: 66  Standing BP: 120/71 HR: 69  Site: --  Mode: --

## 2023-09-22 NOTE — BH DISCHARGE NOTE NURSING/SOCIAL WORK/PSYCH REHAB - NSDCPRGOAL_PSY_ALL_CORE
Writer met with patient to review his safety plan and progress that he made throughout his inpatient stay. Upon approach, patient was resting in bed. Patient was minimally engaging with writer, answering questions with one or two words. Patient reported that everything is “fine” and informed writer that he is getting discharged at 11:30AM. Patient reported having “mixed feelings” about discharge. Patient reported that he is “happy” to play “videogames,” and at the same time “sad” that he doesn’t have a job. Writer provided validations for his ambivalent feeling and encouraged patient to prioritize himself and his health first, then start searching for job that could be a good fit. Patient was receptive. Throughout his stay, patient was minimally engaging during psychiatric rehabilitation rounds. Patient remained mostly isolative despite staff’s encouragement. Writer encouraged patient to utilize coping skills that he acquired from here. Patient presented with limited insight to his symptoms as patient was unable to describe benefits of medication. Psychiatric rehabilitation team recommends patient to follow up in outpatient treatment services for continuity of care.

## 2023-09-22 NOTE — BH INPATIENT PSYCHIATRY PROGRESS NOTE - NSICDXBHSECONDARYDX_PSY_ALL_CORE
Schizophrenia, unspecified type   F20.9  Psychosis   F29  Mood disorder   F39  
Psychosis   F29  Mood disorder   F39

## 2023-09-23 RX ADMIN — ESCITALOPRAM OXALATE 10 MILLIGRAM(S): 10 TABLET, FILM COATED ORAL at 08:46

## 2023-09-24 RX ADMIN — ESCITALOPRAM OXALATE 10 MILLIGRAM(S): 10 TABLET, FILM COATED ORAL at 08:41

## 2023-09-25 PROCEDURE — 99232 SBSQ HOSP IP/OBS MODERATE 35: CPT

## 2023-09-25 RX ORDER — ESCITALOPRAM OXALATE 10 MG/1
15 TABLET, FILM COATED ORAL DAILY
Refills: 0 | Status: DISCONTINUED | OUTPATIENT
Start: 2023-09-26 | End: 2023-09-28

## 2023-09-25 RX ADMIN — ESCITALOPRAM OXALATE 10 MILLIGRAM(S): 10 TABLET, FILM COATED ORAL at 09:35

## 2023-09-25 NOTE — DIETITIAN INITIAL EVALUATION ADULT - PERTINENT LABORATORY DATA
Basic Metabolic Panel (09.05.23 @ 09:30)   Sodium: 141 mmol/L  Potassium: 4.2 mmol/L  Chloride: 105 mmol/L  Carbon Dioxide: 25 mmol/L  Anion Gap: 11 mmol/L  Blood Urea Nitrogen: 11 mg/dL  Creatinine: 0.98 mg/dL  Glucose: 86 mg/dL  Calcium: 9.2 mg/dL  eGFR: 107: The estimated glomerular filtration rate (eGFR) is calculated using the   2021 CKD-EPI creatinine equation, which does not have a coefficient for   race and is validated in individuals 18 years of age and older (N Engl J   Med 2021; [id number]:0151-2078). Creatinine-based eGFR may be inaccurate in   various situations including but not limited to extremes of muscle mass,   altered dietary protein intake, or medications that affect renal tubular   creatinine secretion. mL/min/1.73m2    Phosphorus: 3.2 mg/dL (09.05.23 @ 09:30)   Magnesium: 1.90 mg/dL (09.06.23 @ 10:00)   Vitamin B12, Serum: 452 pg/mL (09.06.23 @ 10:00)   Folate, Serum: 13.8 ng/mL (09.06.23 @ 10:00)     A1C with Estimated Average Glucose Result: 5.5 % (09-06-23 @ 10:00)    Lipid Panel: Date/Time: 09-06-23 @ 10:00  Cholesterol, Serum: 129  Direct LDL: --  HDL Cholesterol, Serum: 56  Total Cholesterol/HDL Ration Measurement: --  Triglycerides, Serum: 76

## 2023-09-25 NOTE — DIETITIAN INITIAL EVALUATION ADULT - CALCULATED FROM (G/KG)
Gianluca Hart is here today for Abdominal Pain    Denies Latex allergy or sensitivity  Tobacco history: verified  Medications: medications verified and updated  Refills needed today?  No        Preferred pharmacy added     Health Maintenance Due   Topic Date Due   • Hepatitis B Vaccine (1 of 3 - 3-dose series) Never done   • Pneumococcal Vaccine 0-64 (2 - PCV) 08/01/2015   • COVID-19 Vaccine (4 - Booster for Pfizer series) 03/11/2022   • Influenza Vaccine (1) 09/01/2022   • Depression Screening  04/28/2023        64

## 2023-09-25 NOTE — BH INPATIENT PSYCHIATRY PROGRESS NOTE - NSBHFUPINTERVALHXFT_PSY_A_CORE
Chart reviewed including pertinent labs, imaging, ekg. case discussed with treatment team.  Over this interval: patient remains socially isolated and to himself in his room. He has no complaints. he state he was out of his room earlier and went back in to rest. per staff, patient also remains mostly to himself and is not often seen out of his room but was noted to be eating. he still has not shaved though he requested for a shave order. he tells me he wants to now grow out his facial hair. no avh. no paranoid contents.

## 2023-09-25 NOTE — DIETITIAN INITIAL EVALUATION ADULT - OTHER INFO
Patient is a 29 y/o male with PPH schizophrenia, 3 prior inpatient psychiatric hospitalizations (2018 [facility], 2023 [place], last at [facility] in June 2023), followed by Dr. Alan Chan at [facility] for outpatient care (no showed August appt), taking Invega Sustenna, no history of SA or violence, no known history of substance use, who presents to the [facility] ED on 9/4/2023 brought in by ROCKY and JUSTIN after he was discovered pouring water on electrical outlets in his home.     Met with patient in his room today who was lying in the bed wrapped under the blanket. Patient reports good appetite with good PO intake at meals, denies chewing/swallowing difficulties on current diet. NKFA reported. Has no specific food preferences voiced today. Patient denies GI distress (nausea/vomiting/diarrhea/ constipation). Chem labs reviewed, unremarkable. Patient has no questions or concerns about his diet at this time.

## 2023-09-25 NOTE — BH INPATIENT PSYCHIATRY PROGRESS NOTE - NSBHCHARTREVIEWVS_PSY_A_CORE FT
Vital Signs Last 24 Hrs  T(C): 36.4 (09-25-23 @ 08:42), Max: 36.4 (09-25-23 @ 08:42)  T(F): 97.6 (09-25-23 @ 08:42), Max: 97.6 (09-25-23 @ 08:42)  HR: --  BP: --  BP(mean): --  RR: --  SpO2: --    Orthostatic VS  09-25-23 @ 08:42  Lying BP: --/-- HR: --  Sitting BP: 118/70 HR: 75  Standing BP: 108/66 HR: 97  Site: --  Mode: --  Orthostatic VS  09-24-23 @ 07:32  Lying BP: --/-- HR: --  Sitting BP: 103/69 HR: 74  Standing BP: 109/75 HR: 79  Site: --  Mode: --

## 2023-09-25 NOTE — BH INPATIENT PSYCHIATRY PROGRESS NOTE - NSBHASSESSSUMMFT_PSY_ALL_CORE
Patient is a 27 y/o male, single, non-caregiver, lives at home with aunt and family, PPH schizophrenia, 3 prior inpatient psychiatric hospitalizations (2018 [facility], 2023 [place], last at [facility] in June 2023), followed by Dr. Alan Chan at [facility] for outpatient care (no showed August appt), taking Invega Sustenna, no history of SA or violence, no known history of substance use, who presents to the [facility] ED on 9/4/2023 brought in by ROCKY and JUSTIN after he was discovered pouring water on electrical outlets in his home.     patient continues to be guarded, remains to himself and stays in his room. self-care is limited. he denies AVH. would attribute depressive symptoms and negative symptoms to be contributing to his present state which is slightly improved relative to when he first came to . will increase Lexapro to 15mg daily.     PLAN:  #Safety: q15 obs    #Psychopharm:    -  INCREASE Lexapro to 15mg daily.  -  Invega Sustenna 156mg IM administered 09/18/23 next due 10/15/23  -  PRNs: Haldol 5 mg PO/IM, Q6H PRN for psychotic agitation, Lorazepam 2mg IM, PRN for severe agitation, Benadryl 50mg PO/IM, Q6H PRN for EPS ppx.  -  Individual, group, milieu therapy.  -  Psychoeducation provided to patient regarding indication for medications, alternatives, side effects, adherence.    #Medical:  -  No acute concerns. tylenol prn for pain   -  Routine labs  -  Hold antipsychotics if QTc >500.    #Legal: 9.27    #Disposition: Collateral and disposition planning pending further symptom and medication optimization.

## 2023-09-25 NOTE — DIETITIAN INITIAL EVALUATION ADULT - PERTINENT MEDS FT
MEDICATIONS  (STANDING):    MEDICATIONS  (PRN):  acetaminophen     Tablet .. 650 milliGRAM(s) Oral every 6 hours PRN Mild Pain (1 - 3), Moderate Pain (4 - 6)  diphenhydrAMINE 50 milliGRAM(s) Oral every 6 hours PRN agitation  diphenhydrAMINE Injectable 50 milliGRAM(s) IntraMuscular once PRN agitation  haloperidol     Tablet 5 milliGRAM(s) Oral every 6 hours PRN agitation  haloperidol    Injectable 5 milliGRAM(s) IntraMuscular once PRN agitation  LORazepam     Tablet 2 milliGRAM(s) Oral every 6 hours PRN Agitation  LORazepam   Injectable 2 milliGRAM(s) IntraMuscular once PRN agitation

## 2023-09-26 RX ADMIN — ESCITALOPRAM OXALATE 15 MILLIGRAM(S): 10 TABLET, FILM COATED ORAL at 09:13

## 2023-09-26 NOTE — BH INPATIENT PSYCHIATRY PROGRESS NOTE - CURRENT MEDICATION
MEDICATIONS  (STANDING):  escitalopram 15 milliGRAM(s) Oral daily    MEDICATIONS  (PRN):  acetaminophen     Tablet .. 650 milliGRAM(s) Oral every 6 hours PRN Mild Pain (1 - 3), Moderate Pain (4 - 6)  diphenhydrAMINE 50 milliGRAM(s) Oral every 6 hours PRN agitation  diphenhydrAMINE Injectable 50 milliGRAM(s) IntraMuscular once PRN agitation  haloperidol     Tablet 5 milliGRAM(s) Oral every 6 hours PRN agitation  haloperidol    Injectable 5 milliGRAM(s) IntraMuscular once PRN agitation  LORazepam     Tablet 2 milliGRAM(s) Oral every 6 hours PRN Agitation  LORazepam   Injectable 2 milliGRAM(s) IntraMuscular once PRN agitation

## 2023-09-26 NOTE — BH INPATIENT PSYCHIATRY PROGRESS NOTE - NSBHCHARTREVIEWVS_PSY_A_CORE FT
Vital Signs Last 24 Hrs  T(C): 36.6 (09-26-23 @ 08:03), Max: 36.6 (09-26-23 @ 08:03)  T(F): 97.9 (09-26-23 @ 08:03), Max: 97.9 (09-26-23 @ 08:03)  HR: --  BP: --  BP(mean): --  RR: --  SpO2: --    Orthostatic VS  09-26-23 @ 08:03  Lying BP: --/-- HR: --  Sitting BP: 108/60 HR: 60  Standing BP: 108/70 HR: 93  Site: --  Mode: --  Orthostatic VS  09-25-23 @ 08:42  Lying BP: --/-- HR: --  Sitting BP: 118/70 HR: 75  Standing BP: 108/66 HR: 97  Site: --  Mode: --
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
## 2023-09-26 NOTE — BH INPATIENT PSYCHIATRY PROGRESS NOTE - NSBHFUPINTERVALHXFT_PSY_A_CORE
Chart reviewed including pertinent labs, imaging, ekg. case discussed with treatment team.  Over this interval: patient continues to remains socially isolated and stays in his room all day. he does get up to eat meals but otherwise does not leave his room. this AM on approach, patient is lying in bed as per usual with sheets covering his face. on approach, he uncovers his face, says hi to me. most of his responses are limited to 1-2 words. he denies SI and HI. denies AVH. reports feeling better with medications and his mood is "happy." no a/e to medications.

## 2023-09-27 PROCEDURE — 99231 SBSQ HOSP IP/OBS SF/LOW 25: CPT

## 2023-09-27 RX ORDER — PALIPERIDONE 1.5 MG/1
156 TABLET, EXTENDED RELEASE ORAL
Qty: 1 | Refills: 0
Start: 2023-09-27 | End: 2023-09-27

## 2023-09-27 RX ORDER — ESCITALOPRAM OXALATE 10 MG/1
3 TABLET, FILM COATED ORAL
Qty: 90 | Refills: 0
Start: 2023-09-27 | End: 2023-10-26

## 2023-09-27 RX ADMIN — ESCITALOPRAM OXALATE 15 MILLIGRAM(S): 10 TABLET, FILM COATED ORAL at 09:59

## 2023-09-27 NOTE — BH INPATIENT PSYCHIATRY PROGRESS NOTE - NSTXPSYCHOGOALOTHER_PSY_ALL_CORE
Will identify 2-3 current symptoms to staff
Will identify 2-3 current symptoms to staff.
Will identify 2-3 recurrent symptoms to staff.
Will identify 2-3 current symptoms to staff
Will identify 2-3 recurrent symptoms to staff.
Will identify 2-3 current symptoms to staff.
Will identify 2-3 recurrent symptoms to staff.
Will identify 2-3 current symptoms to staff.
Will identify 2-3 recurrent symptoms to staff.
Will identify 2-3 current symptoms to staff.
Will identify 2-3 current symptoms to staff
Will identify 2-3 current symptoms to staff
Will identify 2-3 recurrent symptoms to staff.
Will identify 2-3 current symptoms to staff.
Will identify 2-3 recurrent symptoms to staff.

## 2023-09-27 NOTE — BH INPATIENT PSYCHIATRY PROGRESS NOTE - MSE UNSTRUCTURED FT
Appearance: Dressed in hospital gown. Limited self-care.  Attitude / Behavior: Guarded. Avoidant.   Relatedness: Limited.  Eye contact: Poor.   Motor: Psychomotor slowing.  Speech: Soft volume, normal latency. Decreased productivity.  Mood: "Ok."  Affect: Flat.   Thought Process: [place].  Thought Content: Paranoia suspected. No suicidal ideation or HI.  Perceptual: no AVH.  Insight: limited   Judgment: limited   Impulse control:  fair  Cognition: grossly intact  Gait: Intact. 
Appearance: Dressed in hospital gown. Limited self-care.  Attitude / Behavior: Guarded. Avoidant.   Relatedness: Limited.  Eye contact: Poor.   Motor: Psychomotor slowing.  Speech: Soft volume, normal latency. Decreased productivity.  Mood: "Ok."  Affect: Flat.   Thought Process: [place].  Thought Content: No suicidal ideation or HI.  Perceptual: No AVH.  Insight: Limited.  Judgment: Limited.   Impulse control: Fair.  Cognition: Grossly intact.  Gait: Intact. 
Appearance: Dressed in hospital gown. Limited self-care.  Attitude / Behavior: Guarded. Avoidant.   Relatedness: Limited.  Eye contact: Poor.   Motor: Psychomotor slowing.  Speech: Soft volume, normal latency. Decreased productivity.  Mood: "Ok."  Affect: Flat.   Thought Process: [place].  Thought Content: Paranoia suspected. No suicidal ideation or HI.  Perceptual: no AVH.  Insight: limited   Judgment: limited   Impulse control:  fair  Cognition: grossly intact  Gait: Intact. 
Appearance: Dressed in hospital gown. Limited self-care.  Attitude / Behavior: Guarded. Avoidant.   Relatedness: Limited.  Eye contact: Poor.   Motor: Psychomotor slowing.  Speech: Soft volume, normal latency. Decreased productivity.  Mood: "Fine."  Affect: Flat.   Thought Process: [place].  Thought Content: No suicidal ideation or HI.  Perceptual: No AVH.  Insight: Limited.  Judgment: Limited.   Impulse control: Fair.  Cognition: Grossly intact.  Gait: Intact. 
Appearance: Dressed in hospital gown. Limited self-care.  Attitude / Behavior: Guarded. Avoidant.   Relatedness: Limited.  Eye contact: Poor.   Motor: Psychomotor slowing.  Speech: Soft volume, normal latency. Decreased productivity.  Mood: "okay"  Affect: Flat.   Thought Process: [place].  Thought Content: No suicidal ideation or HI.  Perceptual: No AVH.  Insight: Limited.  Judgment: Limited.   Impulse control: Fair.  Cognition: Grossly intact.  Gait: Intact. 
Appearance: Dressed in hospital gown. improved self-care   Attitude / Behavior: more cooperative   Relatedness: Limited.  Eye contact: Poor.   Motor: Psychomotor slowing.  Speech: Soft volume, somewhat increased latency  Mood: "happy"  Affect: Flat.   Thought Process: [place].  Thought Content: No suicidal ideation or HI.  Perceptual: No AVH.  Insight: fair.  Judgment: fair   Impulse control:  appropriate   Cognition: Grossly intact.  Gait: Intact. 
Appearance: Dressed in hospital gown. limited self-care  Attitude / Behavior: guarded. avoidant.   Relatedness: limited   Eye contact: poor   Motor: psychomotor slow   Speech: soft volume, increased latency. decreased productivity   Mood: "ok"  Affect: flat   Thought Process: [place]  Thought Content: paranoia suspected. no suicidal ideation or hi  Perceptual: no avh   Insight: limited   Judgment: limited   Impulse control:  fair  Cognition: grossly intact  Gait: Intact. 
Appearance: Dressed in hospital gown. improved self-care   Attitude / Behavior: cooperative.   Relatedness: fair  Eye contact: fair   Motor: Psychomotor normal   Speech: Soft volume, relatively more spontaneous.   Mood: "happy"  Affect: reactive   Thought Process: better organized. linear in flow.   Thought Content: No suicidal ideation or HI.  Perceptual: No AVH.  Insight: fair.  Judgment: improved  Impulse control:  appropriate   Cognition: Grossly intact.  Gait: Intact. 
Appearance: Dressed in hospital gown. Limited self-care.  Attitude / Behavior: Guarded. Avoidant.   Relatedness: Limited.  Eye contact: Poor.   Motor: Psychomotor slowing.  Speech: Soft volume, normal latency. Decreased productivity.  Mood: "Ok."  Affect: Flat.   Thought Process: [place].  Thought Content: No suicidal ideation or HI.  Perceptual: No AVH.  Insight: Limited.  Judgment: Limited.   Impulse control: Fair.  Cognition: Grossly intact.  Gait: Intact. 
Appearance: Dressed in hospital gown. Limited self-care  Attitude / Behavior: Guarded. Avoidant.   Relatedness: Limited.  Eye contact: Poor.   Motor: Psychomotor slowing.  Speech: Soft volume, normal latency. Decreased productivity.  Mood: "Ok."  Affect: Flat.   Thought Process: [place].  Thought Content: Paranoia suspected. No suicidal ideation or HI.  Perceptual: no AVH.  Insight: limited   Judgment: limited   Impulse control:  fair  Cognition: grossly intact  Gait: Intact. 
Appearance: Dressed in hospital gown. Limited self-care.  Attitude / Behavior: Guarded. Avoidant.   Relatedness: Limited.  Eye contact: Poor.   Motor: Psychomotor slowing.  Speech: Soft volume, normal latency. Decreased productivity.  Mood: "Ok."  Affect: Flat.   Thought Process: [place].  Thought Content: Paranoia suspected. No suicidal ideation or HI.  Perceptual: no AVH.  Insight: limited   Judgment: limited   Impulse control:  fair  Cognition: grossly intact  Gait: Intact. 
Appearance: Dressed in hospital gown. Limited self-care  Attitude / Behavior: Guarded. Avoidant.   Relatedness: Limited.  Eye contact: Poor.   Motor: Psychomotor slowing.  Speech: Soft volume, increased latency. Decreased productivity.  Mood: "Alright."  Affect: flat   Thought Process: [place]  Thought Content: paranoia suspected. no suicidal ideation or hi  Perceptual: no avh   Insight: limited   Judgment: limited   Impulse control:  fair  Cognition: grossly intact  Gait: Intact. 
Appearance: Dressed in hospital gown. Limited self-care.  Attitude / Behavior: Guarded. Avoidant.   Relatedness: Limited.  Eye contact: Poor.   Motor: Psychomotor slowing.  Speech: Soft volume, normal latency. Decreased productivity.  Mood: "Ok."  Affect: Flat.   Thought Process: [place].  Thought Content: No suicidal ideation or HI.  Perceptual: No AVH.  Insight: Limited.  Judgment: Limited.   Impulse control: Fair.  Cognition: Grossly intact.  Gait: Intact. 
Appearance: Dressed in hospital gown. Limited self-care.  Attitude / Behavior: Guarded. Avoidant.   Relatedness: Limited.  Eye contact: Poor.   Motor: Psychomotor slowing.  Speech: Soft volume, normal latency. Decreased productivity.  Mood: "Ok."  Affect: Flat.   Thought Process: [place].  Thought Content: No suicidal ideation or HI.  Perceptual: No AVH.  Insight: limited   Judgment: limited   Impulse control: fair  Cognition: grossly intact  Gait: Intact. 
Appearance: Dressed in hospital gown. limited self-care  Attitude / Behavior: guarded. avoidant.   Relatedness: limited   Eye contact: poor   Motor: psychomotor slow   Speech: soft volume, increased latency. decreased productivity   Mood: "fine"  Affect: flat   Thought Process: [place]  Thought Content: paranoid contents. no suicidal ideation or hi  Perceptual: no avh   Insight: limited   Judgment: limited   Impulse control:  fair  Cognition: grossly intact  Gait: Intact.

## 2023-09-27 NOTE — BH INPATIENT PSYCHIATRY PROGRESS NOTE - NSTXPSYCHOGOAL_PSY_ALL_CORE
Other...

## 2023-09-27 NOTE — BH INPATIENT PSYCHIATRY PROGRESS NOTE - NSTXPSYCHODATETRGT_PSY_ALL_CORE
26-Sep-2023
19-Sep-2023
26-Sep-2023
13-Sep-2023
03-Oct-2023
13-Sep-2023
19-Sep-2023
26-Sep-2023
21-Sep-2023
26-Sep-2023
21-Sep-2023
13-Sep-2023
21-Sep-2023
26-Sep-2023
13-Sep-2023

## 2023-09-27 NOTE — BH INPATIENT PSYCHIATRY PROGRESS NOTE - NSTXANXINTERMD_PSY_ALL_CORE
meds + therapy
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
## 2023-09-27 NOTE — BH INPATIENT PSYCHIATRY PROGRESS NOTE - NSBHFUPINTERVALCCFT_PSY_A_CORE
Patient seen for follow up for psychosis.
follow-up for psychosis
follow-up regarding psychosis
follow up for psychosis/depression
follow up for psychosis/depression
follow-up regarding psychosis
follow-up regarding psychosis
"Family problems"
follow-up for psychosis
follow up mood 
follow up psychosis and mood 
follow-up regarding psychosis
follow-up regarding psychosis
follow-up for psychosis
follow up psychosis + mood

## 2023-09-27 NOTE — BH INPATIENT PSYCHIATRY PROGRESS NOTE - NSBHATTESTTYPEVISIT_PSY_A_CORE
Attending Only
Attending Only
LUANN without on-site Attending supervision
Attending Only
Attending with Resident/Fellow/Student
On-site Attending supervising LUANN (99XXX codes)
On-site Attending supervising LUANN (99XXX codes)
Attending with Resident/Fellow/Student

## 2023-09-27 NOTE — BH INPATIENT PSYCHIATRY PROGRESS NOTE - NSTXPSYCHOPROGRES_PSY_ALL_CORE
Improving
No Change
Improving

## 2023-09-27 NOTE — BH INPATIENT PSYCHIATRY DISCHARGE NOTE - NSBHASSESSSUMMFT_PSY_ALL_CORE
29 yo male with schizophrenia admitted for psychosis. over this interval ***     acute safety assured, symptoms stabilized, medications optimized, and aftercare coordinated. pt stable for ongoing care in a setting less restrictive. 29 yo male with schizophrenia admitted for psychosis. over this interval, patient has remained in good behavioral control. no overt psychotic, manic, or depressive symptoms. at present, acute safety assured, symptoms stabilized, medications optimized, and aftercare coordinated. pt stable for ongoing care in a setting less restrictive. handoff provided to oupt provider. pt explained the need to take medications and to follow up with aftercare. all questions answered. pt looking forward to leaving

## 2023-09-27 NOTE — BH INPATIENT PSYCHIATRY DISCHARGE NOTE - NSBHFUPINTERVALCCFT_PSY_A_CORE
*****date / time ****   follow up psychosis day of discharge   Discharge Progress Note Date and Time: 09-28-23 @ 09:59  follow up psychosis day of discharge

## 2023-09-27 NOTE — BH INPATIENT PSYCHIATRY DISCHARGE NOTE - NSDCCPCAREPLAN_GEN_ALL_CORE_FT
PRINCIPAL DISCHARGE DIAGNOSIS  Diagnosis: Schizophrenia  Assessment and Plan of Treatment: invega sustenna      SECONDARY DISCHARGE DIAGNOSES  Diagnosis: Mood disorder  Assessment and Plan of Treatment: lexapro

## 2023-09-27 NOTE — BH INPATIENT PSYCHIATRY PROGRESS NOTE - NSBHFUPINTERVALHXFT_PSY_A_CORE
Chart reviewed including pertinent labs, imaging, ekg. case discussed with treatment team.  Over this interval: patient seen sitting in his room today. he reports having eaten breakfast. he denies having sx of psychosis. he denies SI and HI. he expresses future oriented thinking states he will look for a job upon discharge. he is also looking forward to playing PoPingupmon on his phone when he leaves the hospital. he denies AVH. no a/e to medications. no behavioral issues over this interval. patient is tending to his ADLs. per family, this is patient's baseline, he remains mostly to himself in his room

## 2023-09-27 NOTE — BH INPATIENT PSYCHIATRY PROGRESS NOTE - NSTXANXDATETRGT_PSY_ALL_CORE
28-Sep-2023
13-Sep-2023
28-Sep-2023
13-Sep-2023
13-Sep-2023
21-Sep-2023
13-Sep-2023
13-Sep-2023
28-Sep-2023
21-Sep-2023
13-Sep-2023
21-Sep-2023
21-Sep-2023
28-Sep-2023
28-Sep-2023

## 2023-09-27 NOTE — BH INPATIENT PSYCHIATRY DISCHARGE NOTE - NSBHFUPINTERVALHXFT_PSY_A_CORE
Chart reviewed including pertinent labs, imaging, ekg. case discussed with treatment team.  Over this interval:  Chart reviewed including pertinent labs, imaging, ekg. case discussed with treatment team.  Over this interval: no behavioral issues. adherent to treatment. pt feels "happy" that he is going home, states he will check his emails to see if any job offers were sent to him. he is also looking fowrard to playing poVirtusize on his phone. denies AVH. denies SI and HI. tending to ADLs.

## 2023-09-27 NOTE — BH INPATIENT PSYCHIATRY PROGRESS NOTE - PRN MEDS
MEDICATIONS  (PRN):  acetaminophen     Tablet .. 650 milliGRAM(s) Oral every 6 hours PRN Mild Pain (1 - 3), Moderate Pain (4 - 6)  diphenhydrAMINE 50 milliGRAM(s) Oral every 6 hours PRN agitation  diphenhydrAMINE Injectable 50 milliGRAM(s) IntraMuscular once PRN agitation  haloperidol     Tablet 5 milliGRAM(s) Oral every 6 hours PRN agitation  haloperidol    Injectable 5 milliGRAM(s) IntraMuscular once PRN agitation  LORazepam     Tablet 2 milliGRAM(s) Oral every 6 hours PRN Agitation  LORazepam   Injectable 2 milliGRAM(s) IntraMuscular once PRN agitation  
MEDICATIONS  (PRN):  diphenhydrAMINE 50 milliGRAM(s) Oral every 6 hours PRN agitation  diphenhydrAMINE Injectable 50 milliGRAM(s) IntraMuscular once PRN agitation  haloperidol     Tablet 5 milliGRAM(s) Oral every 6 hours PRN agitation  haloperidol    Injectable 5 milliGRAM(s) IntraMuscular once PRN agitation  LORazepam     Tablet 2 milliGRAM(s) Oral every 6 hours PRN Agitation  LORazepam   Injectable 2 milliGRAM(s) IntraMuscular once PRN agitation  
MEDICATIONS  (PRN):  diphenhydrAMINE 50 milliGRAM(s) Oral every 6 hours PRN agitation  diphenhydrAMINE Injectable 50 milliGRAM(s) IntraMuscular once PRN agitation  haloperidol     Tablet 5 milliGRAM(s) Oral every 6 hours PRN agitation  haloperidol    Injectable 5 milliGRAM(s) IntraMuscular once PRN agitation  LORazepam     Tablet 2 milliGRAM(s) Oral every 6 hours PRN Agitation  LORazepam   Injectable 2 milliGRAM(s) IntraMuscular once PRN agitation  
MEDICATIONS  (PRN):  acetaminophen     Tablet .. 650 milliGRAM(s) Oral every 6 hours PRN Mild Pain (1 - 3), Moderate Pain (4 - 6)  diphenhydrAMINE 50 milliGRAM(s) Oral every 6 hours PRN agitation  diphenhydrAMINE Injectable 50 milliGRAM(s) IntraMuscular once PRN agitation  haloperidol     Tablet 5 milliGRAM(s) Oral every 6 hours PRN agitation  haloperidol    Injectable 5 milliGRAM(s) IntraMuscular once PRN agitation  LORazepam     Tablet 2 milliGRAM(s) Oral every 6 hours PRN Agitation  LORazepam   Injectable 2 milliGRAM(s) IntraMuscular once PRN agitation  
MEDICATIONS  (PRN):  diphenhydrAMINE 50 milliGRAM(s) Oral every 6 hours PRN agitation  diphenhydrAMINE Injectable 50 milliGRAM(s) IntraMuscular once PRN agitation  haloperidol     Tablet 5 milliGRAM(s) Oral every 6 hours PRN agitation  haloperidol    Injectable 5 milliGRAM(s) IntraMuscular once PRN agitation  LORazepam     Tablet 2 milliGRAM(s) Oral every 6 hours PRN Agitation  LORazepam   Injectable 2 milliGRAM(s) IntraMuscular once PRN agitation

## 2023-09-27 NOTE — BH INPATIENT PSYCHIATRY PROGRESS NOTE - NSBHCHARTREVIEWVS_PSY_A_CORE FT
Vital Signs Last 24 Hrs  T(C): 36.8 (09-27-23 @ 07:55), Max: 36.8 (09-26-23 @ 17:41)  T(F): 98.3 (09-27-23 @ 07:55), Max: 98.3 (09-27-23 @ 07:55)  HR: --  BP: --  BP(mean): --  RR: --  SpO2: --    Orthostatic VS  09-27-23 @ 07:55  Lying BP: --/-- HR: --  Sitting BP: 100/65 HR: 68  Standing BP: 101/68 HR: 72  Site: --  Mode: --  Orthostatic VS  09-26-23 @ 08:03  Lying BP: --/-- HR: --  Sitting BP: 108/60 HR: 60  Standing BP: 108/70 HR: 93  Site: --  Mode: --

## 2023-09-27 NOTE — BH INPATIENT PSYCHIATRY PROGRESS NOTE - NSTXDEPRESDATETRGT_PSY_ALL_CORE
21-Sep-2023
21-Sep-2023
26-Sep-2023
13-Sep-2023
26-Sep-2023
13-Sep-2023
21-Sep-2023
13-Sep-2023
21-Sep-2023
26-Sep-2023
13-Sep-2023
21-Sep-2023
21-Sep-2023

## 2023-09-27 NOTE — BH INPATIENT PSYCHIATRY PROGRESS NOTE - NSTXDEPRESDATEEST_PSY_ALL_CORE
06-Sep-2023

## 2023-09-27 NOTE — BH INPATIENT PSYCHIATRY DISCHARGE NOTE - HOSPITAL COURSE
Patient admitted with concerns for psychosis. Pt initially admitted to low 4 and was started on Invega PO. early hospital course notable for one episode of agitation secondary to psychosis requiring STAT IM medications. patient tested positive for COVID and transferred to quarantine on 2N. During stay on 2N, patient was initially isolative, demonstrating poor self-care, expressing paranoia towards his family. Invega titrated to 9mg and transitioned to Invega Sustenna 156mg received 9/18/23. Patients psychotic symptoms improved but there were depressive symptoms for which lexapro was titrated to 15mg daily. psychoed provided. as hospitalization progressed, patient was less socially isolative, better tended to ADLs, persistently denied symptoms of psychosis, persistently denied SI and HI. at present, patient is stable for ongoing care in a setting less restrictive     Discharge planning conducted with patient in collaboration with treatment team. Patient is instructed about aftercare appointment Dr. Chan     Safety planning conducted with pt, and discharge was discussed with multi-disciplinary team. patient has been in appropriate behavioral control, was future oriented, and on discharge able to consistently deny suicidality and homicidality, and maintained ADLs independently. Patient progressed well and their symptoms stabilized by time of discharge. Patient was adherent with medications and by day of discharge, did not have any active psychiatric symptoms. Patient with improved mental status exam and with improved insight judgement and impulse control. Patient able to participate in the modalities of therapy offered in this inpatient setting. They are able to participate in safe disposition planning. At present, patient is not a danger to self or others, has achieved optimal benefits from hospitalization, and thus appropriate for less restrictive level of care. Psychoed provided to patient regarding diagnosis, need for ongoing treatment including medications and therapy, to refrain from driving until they speak with their psychiatrist and are well adjusted to medications.     Patient is at chronically elevated risk for self-harm due to age, sex, psych dx, hx of non-adherence to treatment, financial stressors, hx of impulsivity, poor insight when non-adherent to medications. At this time, those risks are mitigated by patient’s expressed desire to live. Patient currently denies SIIP.    Protective factors include current stability of symptoms, adherence to medications at this time, no past SA, future oriented thinking, improved frustration tolerance as evidenced by no recent behavioral issues on the unit, improved insight and judgement, abstinence from substance while in a controlled setting, Orthodoxy and spirituality, supportive family members, access to treatment and care, no access to fire arms     At present, patient has remained in good behavioral control while inpatient. They have not recently displayed any concerning symptoms of behavioral dysregulation or decompensation, has been actively participating in the milieu, expressing future oriented thinking and is insightful about self-harm. Currently, patient is adamant about their denial of suicidal ideation intent or plan. They are not at acutely elevated risk for self-harm at this time.     low acute risk for harm to others. risk factors include male sex, poor insight when nonadherent to medications and psychiatrically decompensated. protective factors include no known hx of harm to others, not endorsing harm to others, understands the consequences of harm to others, no access to firearms. patient with intact reality testing at this time and exhibiting appropriate judgment and not an acute risk for harm to others at this time

## 2023-09-27 NOTE — BH INPATIENT PSYCHIATRY PROGRESS NOTE - NSBHASSESSSUMMFT_PSY_ALL_CORE
Patient is a 27 y/o male, single, non-caregiver, lives at home with aunt and family, PPH schizophrenia, 3 prior inpatient psychiatric hospitalizations (2018 [facility], 2023 [place], last at [facility] in June 2023), followed by Dr. Alan Chan at [facility] for outpatient care (no showed August appt), taking Invega Sustenna, no history of SA or violence, no known history of substance use, who presents to the [facility] ED on 9/4/2023 brought in by ROCKY and JUSTIN after he was discovered pouring water on electrical outlets in his home.     improved psychosis, mood improvements, pt appears near baseline. will coordinate discharge     PLAN:  #Safety: q15 obs    #Psychopharm:    -  c/w Lexapro to 15mg daily.  -  Invega Sustenna 156mg IM administered 09/18/23 next due 10/15/23  -  PRNs: Haldol 5 mg PO/IM, Q6H PRN for psychotic agitation, Lorazepam 2mg IM, PRN for severe agitation, Benadryl 50mg PO/IM, Q6H PRN for EPS ppx.  -  Individual, group, milieu therapy.  -  Psychoeducation provided to patient regarding indication for medications, alternatives, side effects, adherence.    #Medical:  -  No acute concerns. tylenol prn for pain   -  Routine labs  -  Hold antipsychotics if QTc >500.    #Legal: 9.27    #Disposition: Collateral and disposition planning pending further symptom and medication optimization.

## 2023-09-27 NOTE — BH INPATIENT PSYCHIATRY DISCHARGE NOTE - OTHER PAST PSYCHIATRIC HISTORY (INCLUDE DETAILS REGARDING ONSET, COURSE OF ILLNESS, INPATIENT/OUTPATIENT TREATMENT)
According to  Assessment conducted on 9/4/23, "Patient is a 29 y/o M, single, non-caregiver, lives at home with aunt and aunt's family, PPH schizophrenia, 3 prior inpatient psychiatric hospitalizations (2018 [facility], 2023 [place], last at [facility] in June 2023), followed by Dr. Chan at [facility] for outpatient care (no showed August appt), taking Invega sustenna, no history of SA or violence, no known history of substance use, who presents to the [facility] ED on 9/4/2023 brought in by ROCKY and JUSTIN after he was discovered pouring water on electrical outlets in his home. Please see  note for collateral from pt's aunt. Briefly, family has observed patient to be withdrawn, easily agitated/irritated, and behaving bizarrely (pouring water in outlets, mopping it up, breaking things around house). They are concerned about psychiatric decompensation and advocating for admission.

## 2023-09-27 NOTE — BH INPATIENT PSYCHIATRY PROGRESS NOTE - NSTXDCOPNODATETRGT_PSY_ALL_CORE
27-Sep-2023
13-Sep-2023
20-Sep-2023
13-Sep-2023
13-Sep-2023
27-Sep-2023
20-Sep-2023
20-Sep-2023
27-Sep-2023
13-Sep-2023
20-Sep-2023
13-Sep-2023
27-Sep-2023
13-Sep-2023
13-Sep-2023
27-Sep-2023
20-Sep-2023

## 2023-09-27 NOTE — BH INPATIENT PSYCHIATRY PROGRESS NOTE - NSTXDEPRESGOAL_PSY_ALL_CORE
Will identify 2 coping skills that assist in improving mood

## 2023-09-27 NOTE — BH INPATIENT PSYCHIATRY DISCHARGE NOTE - NSDCMRMEDTOKEN_GEN_ALL_CORE_FT
escitalopram 5 mg oral tablet: 3 tab(s) orally once a day take 3 tablets by mouth, daily for total 15mg  Ashlee Sustenna 156 mg/mL intramuscular suspension, extended release: 156 milligram(s) intramuscularly every 28 days next due 10/16/23

## 2023-09-27 NOTE — BH INPATIENT PSYCHIATRY DISCHARGE NOTE - NSBHDCRISKMITIGATE_PSY_ALL_CORE
Reduction in access to lethal methods (pills, firearms, etc)/Family/Other social support involvement/Long acting injectable medication/Medications targeting suicidality/non-suicidal self injurious behavior

## 2023-09-27 NOTE — BH INPATIENT PSYCHIATRY PROGRESS NOTE - NSDCCRITERIA_PSY_ALL_CORE
symptom improvement

## 2023-09-27 NOTE — BH INPATIENT PSYCHIATRY PROGRESS NOTE - NSTXDCOPNODATEEST_PSY_ALL_CORE
13-Sep-2023
20-Sep-2023
06-Sep-2023
13-Sep-2023
06-Sep-2023
13-Sep-2023
06-Sep-2023
20-Sep-2023
06-Sep-2023
20-Sep-2023
06-Sep-2023
13-Sep-2023
20-Sep-2023
13-Sep-2023
20-Sep-2023

## 2023-09-27 NOTE — BH INPATIENT PSYCHIATRY PROGRESS NOTE - NSTXANXGOAL_PSY_ALL_CORE
Be able to participate in activities despite lingering anxiety/panic

## 2023-09-27 NOTE — BH INPATIENT PSYCHIATRY PROGRESS NOTE - NSBHATTESTBILLING_PSY_A_CORE
01299-Atfcpnxwux OBS or IP - low complexity OR 25-34 mins
22412-Jzozmssyzl OBS or IP - moderate complexity OR 35-49 mins
12723-Tvpcoimomn OBS or IP - low complexity OR 25-34 mins
63732-Jycdexjsdo OBS or IP - moderate complexity OR 35-49 mins
50474-Gokjqawmeh OBS or IP - low complexity OR 25-34 mins
36744-Rbcjqhscpv OBS or IP - low complexity OR 25-34 mins
64106-Abakmxipib OBS or IP - low complexity OR 25-34 mins
81366-Ppiomlxumy OBS or IP - moderate complexity OR 35-49 mins
89762-Oheklatpxt OBS or IP - low complexity OR 25-34 mins
15660-Yhaktsxyoc OBS or IP - moderate complexity OR 35-49 mins
53389-Mwxipfkghh OBS or IP - low complexity OR 25-34 mins
45218-Kfzzcjjpyv OBS or IP - low complexity OR 25-34 mins
46567-Uuqjnaqtiz OBS or IP - low complexity OR 25-34 mins
18162-Zhveceucxf OBS or IP - moderate complexity OR 35-49 mins
30166-Euvivtpgdg - moderate complexity OR 30-39 mins
86292-Ohnkvrkrlv - moderate complexity OR 30-39 mins
95179-Bdwzohgjca OBS or IP - low complexity OR 25-34 mins

## 2023-09-27 NOTE — BH INPATIENT PSYCHIATRY DISCHARGE NOTE - MSE UNSTRUCTURED FT
Appearance: Dressed appropriately.  Attitude / Behavior:   Relatedness:  Eye contact:  Motor: No abnormal movements, no psychomotor slowing or activation.  Speech: Regular rate.  Mood: "  Affect:   Thought Process:   Thought Content:   Perceptual:   Insight:   Judgment:  Impulse control:    Cognition: grossly intact  Gait: Intact.  Appearance: Dressed appropriately.  Attitude / Behavior: cooperative. somewhat childlike   Relatedness:fair   Eye contact: fair   Motor: No abnormal movements, no psychomotor slowing or activation.  Speech: Regular rate. soft volume  Mood: "happy"  Affect: neutral, reactive. mood-congruent.   Thought Process: somewhat concrete but overall linear in flow   Thought Content: denies SI and HI. he is expressing immediately future oriented thinking   Perceptual: denies AVH.   Insight: fair   Judgment: appropriate / improved  Impulse control:  good  Cognition: grossly intact  Gait: Intact.

## 2023-09-28 VITALS — TEMPERATURE: 97 F

## 2023-09-28 RX ADMIN — ESCITALOPRAM OXALATE 15 MILLIGRAM(S): 10 TABLET, FILM COATED ORAL at 08:02

## 2023-10-31 ENCOUNTER — OUTPATIENT (OUTPATIENT)
Dept: OUTPATIENT SERVICES | Facility: HOSPITAL | Age: 28
LOS: 1 days | Discharge: HOME | End: 2023-10-31
Payer: MEDICAID

## 2023-12-05 NOTE — BH INPATIENT PSYCHIATRY ASSESSMENT NOTE - NSCURPASTPSYDX_PSY_ALL_CORE
Detail Level: Simple Detail Level: Generalized Detail Level: Detailed Quality 402: Tobacco Use And Help With Quitting Among Adolescents: Tobacco Screening OR Tobacco Cessation Intervention not Performed Reason Not Otherwise Specified Quality 130: Documentation Of Current Medications In The Medical Record: Current Medications Documented Quality 431: Preventive Care And Screening: Unhealthy Alcohol Use - Screening: Patient not identified as an unhealthy alcohol user when screened for unhealthy alcohol use using a systematic screening method Detail Level: Detailed Psychotic disorder

## 2023-12-06 DIAGNOSIS — F20.9 SCHIZOPHRENIA, UNSPECIFIED: ICD-10-CM

## 2024-02-28 PROCEDURE — 99214 OFFICE O/P EST MOD 30 MIN: CPT

## 2024-02-28 PROCEDURE — 90833 PSYTX W PT W E/M 30 MIN: CPT

## 2024-04-29 NOTE — ED BEHAVIORAL HEALTH ASSESSMENT NOTE - NS ED BHA PLAN ADMIT TO PSYCHIATRY BH CONTACTED FT
For information on Fall & Injury Prevention, visit: [email or web address] OR  [email or web address] OR  https://www.cdc.gov/steadi/patient.html
None known

## 2024-06-04 PROCEDURE — 99214 OFFICE O/P EST MOD 30 MIN: CPT

## 2024-06-04 PROCEDURE — 90833 PSYTX W PT W E/M 30 MIN: CPT

## 2024-08-05 PROCEDURE — 90833 PSYTX W PT W E/M 30 MIN: CPT | Mod: 93

## 2024-08-05 PROCEDURE — 99214 OFFICE O/P EST MOD 30 MIN: CPT | Mod: 95

## 2024-08-08 NOTE — BH INPATIENT PSYCHIATRY ASSESSMENT NOTE - NSBHATTESTTYPEVISIT_PSY_A_CORE
Detail Level: Detailed Size Of Lesion: 0.35x0.15 cm Size Of Lesion: 0.35x0.25cm On-site Attending supervising LUANN (99XXX codes)

## 2024-12-04 PROCEDURE — 99214 OFFICE O/P EST MOD 30 MIN: CPT | Mod: 95

## 2024-12-04 PROCEDURE — 90833 PSYTX W PT W E/M 30 MIN: CPT | Mod: 93

## 2025-01-08 PROCEDURE — 90833 PSYTX W PT W E/M 30 MIN: CPT | Mod: 93

## 2025-01-08 PROCEDURE — 99214 OFFICE O/P EST MOD 30 MIN: CPT | Mod: 95

## 2025-02-03 PROCEDURE — 99214 OFFICE O/P EST MOD 30 MIN: CPT | Mod: 95

## 2025-02-03 PROCEDURE — 90833 PSYTX W PT W E/M 30 MIN: CPT | Mod: 93

## 2025-03-10 PROCEDURE — 99214 OFFICE O/P EST MOD 30 MIN: CPT | Mod: 95

## 2025-03-10 PROCEDURE — 90833 PSYTX W PT W E/M 30 MIN: CPT | Mod: 93

## 2025-04-01 NOTE — DIETITIAN INITIAL EVALUATION ADULT - NSFNSPHYEXAMSKINFT_GEN_A_CORE
12:00pm to 12:55pm  Met with client today for his individual session via telehealth.  Client was given an ADHD specific assessment interview.  Upon completion of the assessment client's interview was scored and client does not have ADHD. Client will be given his results during the next session.   no pressure injuries; noted R hand wound healing

## 2025-04-07 PROCEDURE — 90833 PSYTX W PT W E/M 30 MIN: CPT | Mod: 93

## 2025-04-07 PROCEDURE — 99214 OFFICE O/P EST MOD 30 MIN: CPT | Mod: 95
